# Patient Record
Sex: MALE | Race: BLACK OR AFRICAN AMERICAN | Employment: FULL TIME | URBAN - METROPOLITAN AREA
[De-identification: names, ages, dates, MRNs, and addresses within clinical notes are randomized per-mention and may not be internally consistent; named-entity substitution may affect disease eponyms.]

---

## 2017-01-25 DIAGNOSIS — I10 HTN (HYPERTENSION), BENIGN: ICD-10-CM

## 2017-01-25 DIAGNOSIS — E78.5 DYSLIPIDEMIA: ICD-10-CM

## 2017-01-26 RX ORDER — ATENOLOL 25 MG/1
TABLET ORAL
Qty: 45 TAB | Refills: 0 | Status: SHIPPED | OUTPATIENT
Start: 2017-01-26 | End: 2017-05-03 | Stop reason: SDUPTHER

## 2017-09-15 ENCOUNTER — OFFICE VISIT (OUTPATIENT)
Dept: CARDIOLOGY CLINIC | Age: 62
End: 2017-09-15

## 2017-09-15 VITALS
OXYGEN SATURATION: 98 % | DIASTOLIC BLOOD PRESSURE: 70 MMHG | HEART RATE: 51 BPM | RESPIRATION RATE: 16 BRPM | BODY MASS INDEX: 32.07 KG/M2 | WEIGHT: 224 LBS | SYSTOLIC BLOOD PRESSURE: 130 MMHG | HEIGHT: 70 IN

## 2017-09-15 DIAGNOSIS — I10 HTN (HYPERTENSION), BENIGN: ICD-10-CM

## 2017-09-15 DIAGNOSIS — I25.10 ATHEROSCLEROSIS OF NATIVE CORONARY ARTERY OF NATIVE HEART WITHOUT ANGINA PECTORIS: Primary | ICD-10-CM

## 2017-09-15 DIAGNOSIS — E78.5 DYSLIPIDEMIA: ICD-10-CM

## 2017-09-15 RX ORDER — CLOPIDOGREL BISULFATE 75 MG/1
75 TABLET ORAL DAILY
Qty: 30 TAB | Refills: 11
Start: 2017-09-15 | End: 2020-02-07 | Stop reason: SDUPTHER

## 2017-09-15 NOTE — PROGRESS NOTES
James Olson MD. [facility] [place]              Patient: Cheri Paz  : 1955      Today's Date: 9/15/2017          HISTORY OF PRESENT ILLNESS:     History of Present Illness:  Mr. Eliot Christine is here for follow-up. He is doing well. Still working. No cardiac complaints. No CP or SOB. No dizziness or lightheadedness. He exercises (walks) regularly. PAST MEDICAL HISTORY:     Past Medical History:   Diagnosis Date    Coronary atherosclerosis of native coronary artery     BESS to 90% prox LAD leison 1/3/12;   PCI in ;  MI  with PCI to LCX    Dyslipidemia     Gout     HTN (hypertension)          Past Surgical History:   Procedure Laterality Date    CARDIAC CATHETERIZATION  1/3/12    90% prox LAD stenosis; LVEF 55%; had Xience BESS placed to prox LAD    CARDIAC CATHETERIZATION      PCI to LCX    ECHO STRESS  11    he walked 6:31, stopping for chest pain. 2 mm inferolateral ST depressions . Stress echo images were grossly normal.     HX OTHER SURGICAL      Exercise cardiolite 13 - walked 9 min (10 METS), normal stress EKG and MPI, LVEF 52%     STRESS TEST CARDIAC  3/09    treadmill only - walked 9:05; blunted HR response due to beta-blocker; no angina and normal EKG    STRESS TEST CARDIAC  12    walked 9 min, (10.2 METS) non-diagnostic EKg chanes; normal stress test; few PVC's           MEDICATIONS:     Current Outpatient Prescriptions   Medication Sig Dispense Refill    atenolol (TENORMIN) 25 mg tablet TAKE ONE-HALF TABLET BY MOUTH ONCE DAILY 45 Tab 1    atorvastatin (LIPITOR) 40 mg tablet Take 1 Tab by mouth daily. 90 Tab 3    sildenafil citrate (VIAGRA) 50 mg tablet Take 1 Tab by mouth as needed. 5 Tab 12    ezetimibe (ZETIA) 10 mg tablet Take 1 Tab by mouth daily. 30 Tab 12    ranitidine hcl 150 mg capsule Take 150 mg by mouth two (2) times a day.  acetaminophen (TYLENOL) 325 mg tablet Take  by mouth every four (4) hours as needed for Pain.       ibuprofen (MOTRIN) 800 mg tablet Take  by mouth as needed.  hydrocortisone (CORTIZONE-10) 1 % topical cream Apply  to affected area as needed. use thin layer      Febuxostat (ULORIC) 80 mg Tab Take  by mouth.  nitroglycerin (NITROSTAT) 0.4 mg SL tablet 1 Tab by SubLINGual route every five (5) minutes as needed for Chest Pain. Call 911 if CP not better in 5 min. 25 Tab 3    clopidogrel (PLAVIX) 75 mg tablet Take 1 Tab by mouth daily. 30 Tab 11       Allergies   Allergen Reactions    Allopurinol Itching     swelling             SOCIAL HISTORY:     Social History   Substance Use Topics    Smoking status: Never Smoker    Smokeless tobacco: Never Used    Alcohol use No           FAMILY HISTORY:     Family History   Problem Relation Age of Onset    Stroke Sister              REVIEW OF SYSTEMS:         Review of Systems:    Constitutional: Negative for fever, chills    HEENT: Negative for vision changes.    Respiratory: Negative for cough    Cardiovascular: Negative for orthopnea, syncope, and PND.    Gastrointestinal: Negative for abdominal pain, diarrhea, or melena    Genitourinary: Negative for dysuria    Musculoskeletal: Negative for myalgias.    Skin: Negative for rash    Heme: No problems bleeding.    Neurological: Negative for speech change and focal weakness.                       PHYSICAL EXAM:        Physical Exam:  Visit Vitals    /70 (BP 1 Location: Left arm, BP Patient Position: Sitting)    Pulse (!) 51    Resp 16    Ht 5' 9.5\" (1.765 m)    Wt 224 lb (101.6 kg)    SpO2 98%    BMI 32.6 kg/m2       Patient appears generally well, mood and affect are appropriate and pleasant. HEENT: Hearing intact, non-icteric, normocephalic, atraumatic.    Neck Exam: Supple, No JVD or carotid bruits.    Lung Exam: Clear to auscultation, even breath sounds.    Cardiac Exam: Regular rate and rhythm with no murmur  Abdomen: Soft, non-tender, normal bowel sounds. No bruits or masses.   Extremities: Moves all ext well. No lower extremity edema. Vascular: 2+ dorsalis pedis pulses bilaterally. Psych: Appropriate affect  Neuro - Grossly intact                  LABS / OTHER STUDIES:         Labs 9/3/15 - Cr 1.1, K 3.6, Alk Phos 169, AST/ALT OK, chol 148, HDL 35, LDL 96, ,           CARDIAC DIAGNOSTICS:             EKG 2/11/13 - marked sinus bradycardia, HR 47, NSST changes    EKG 8/11/13 - sinus bradycardia, HR 54, ST-T changes in the inferolateral leads (ST-T changes are more pronounced c/w 2/11/13)    EKG 11/26/14 - sinus bradycardia, HR 42, inferolateral ST-T changes    EKG 3/16/16 - sinus linnea, HR 49, inferolateral ST-T changes   EKG 9/15/17 - sinus linnea, HR 51, NSST changes               ASSESSMENT AND PLAN:         Assessment and Plan:   1) CAD    - PCI in 2006; MI 11/08 with PCI to LCX; BESS to 90% prox LAD leison 1/3/12  - Exercise cardiolite 8/20/13 - walked 9 min (10 METS), normal stress EKG and MPI, LVEF 52%    - Mr. Brooklyn Campos denies any anginal complaints    - He is on plavix, statin, low dose BB --- (he takes a single antiplatelet)   - discussed importance of healthy diet and,exercise       2) HTN    - Due to concerns of gout, we cut back chlorthalidone in past and this was later stopped  - continue meds  - Asked him to follow BP at home  (goal < 135/85)       3) Dyslipidemia  - Goal LDL < 70 given that he has had coronary stenting 3 times  - He is on atorvastatin and zetia - lipids followed by PCP - will get labs to review       4) Colonoscopy in October  - he can hold plavix 5 days prior to procedure and resume afterwards. Take ASA 81 mg daily while off of plavix     5) He would like to follow-up in [place] since easier for him. Will arrange him to see Dr. Abby Polanco there. Patient expressed understanding of the plan - questions were answered.       He is retired from [employer].   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    Brigette Haji MD, [address] [facility] [address]  [address] [facility], [facility] [address]  [address], [address]  Pamela, [address] [place] [address]  Ph: [phone]   Ph [phone]

## 2017-09-22 ENCOUNTER — OP HISTORICAL/CONVERTED ENCOUNTER (OUTPATIENT)
Dept: OTHER | Age: 62
End: 2017-09-22

## 2018-02-07 ENCOUNTER — OP HISTORICAL/CONVERTED ENCOUNTER (OUTPATIENT)
Dept: OTHER | Age: 63
End: 2018-02-07

## 2018-04-13 ENCOUNTER — OP HISTORICAL/CONVERTED ENCOUNTER (OUTPATIENT)
Dept: OTHER | Age: 63
End: 2018-04-13

## 2018-04-24 LAB
CREATININE, EXTERNAL: 1.03
HBA1C MFR BLD HPLC: 6 %
LDL-C, EXTERNAL: 66

## 2018-06-22 ENCOUNTER — HOSPITAL ENCOUNTER (OUTPATIENT)
Dept: LAB | Age: 63
Discharge: HOME | End: 2018-06-22
Payer: MEDICARE

## 2018-06-22 ENCOUNTER — OFFICE VISIT (OUTPATIENT)
Dept: ENDOCRINOLOGY | Age: 63
End: 2018-06-22

## 2018-06-22 VITALS
TEMPERATURE: 97.5 F | RESPIRATION RATE: 18 BRPM | OXYGEN SATURATION: 95 % | WEIGHT: 227.2 LBS | DIASTOLIC BLOOD PRESSURE: 78 MMHG | HEIGHT: 69 IN | BODY MASS INDEX: 33.65 KG/M2 | SYSTOLIC BLOOD PRESSURE: 126 MMHG | HEART RATE: 60 BPM

## 2018-06-22 DIAGNOSIS — E78.5 DYSLIPIDEMIA: ICD-10-CM

## 2018-06-22 DIAGNOSIS — I25.10 ATHEROSCLEROSIS OF NATIVE CORONARY ARTERY OF NATIVE HEART WITHOUT ANGINA PECTORIS: ICD-10-CM

## 2018-06-22 DIAGNOSIS — R73.03 PREDIABETES: ICD-10-CM

## 2018-06-22 DIAGNOSIS — E74.39 GLUCOSE INTOLERANCE: ICD-10-CM

## 2018-06-22 DIAGNOSIS — E55.9 VITAMIN D DEFICIENCY: ICD-10-CM

## 2018-06-22 DIAGNOSIS — E07.9 THYROID DISEASE: ICD-10-CM

## 2018-06-22 DIAGNOSIS — R74.8 ELEVATED ALKALINE PHOSPHATASE LEVEL: Primary | ICD-10-CM

## 2018-06-22 PROCEDURE — 83036 HEMOGLOBIN GLYCOSYLATED A1C: CPT

## 2018-06-22 PROCEDURE — 80053 COMPREHEN METABOLIC PANEL: CPT

## 2018-06-22 PROCEDURE — 84080 ASSAY ALKALINE PHOSPHATASES: CPT

## 2018-06-22 PROCEDURE — 84443 ASSAY THYROID STIM HORMONE: CPT

## 2018-06-22 PROCEDURE — 84550 ASSAY OF BLOOD/URIC ACID: CPT

## 2018-06-22 PROCEDURE — 36415 COLL VENOUS BLD VENIPUNCTURE: CPT

## 2018-06-22 PROCEDURE — 82306 VITAMIN D 25 HYDROXY: CPT

## 2018-06-22 NOTE — PATIENT INSTRUCTIONS
--------------------------------------------------------------------------------------------    Refills    -    please call your pharmacy and have them send us a refill request    Results  -  allow up to a week for lab results to be processed and reviewed. Phone calls  -  Allow upto 24 hrs.  for non-urgent calls to be retained    Prior authorization - It may take up to 2 weeks to process, depending on your insurance    Forms  -  FMLA, DMV, patient assistance, etc. will take up to 2 weeks to process    Cancellations - please notify the office in advance if you cannot keep your appointment    Samples  - will only be dispensed at visits as supply is limited      If you are having a medical emergency call 911    --------------------------------------------------------------------------------------------

## 2018-06-22 NOTE — MR AVS SNAPSHOT
[address] [id number] 
[phone] Patient: Corie Petty MRN: [id number] JQS:[id number] Visit Information Date & Time Provider Department Dept. Phone Encounter #  
 6/22/2018  1:00 PM Chico Alonso MD [facility] Diabetes & Endocrinology [phone] [id number] Follow-up Instructions Return in about 6 months (around 12/22/2018). Your Appointments 10/5/2018 11:40 AM  
ESTABLISHED PATIENT with Tosha Schmidt MD  
[facility] ([address]) Appt Note: annual visit; 4/17/18 lm for pt of appt day and time change from 9/21/18 sll [address] [facility] [address] [address] [address] Upcoming Health Maintenance Date Due Hepatitis C Screening 1955 DTaP/Tdap/Td series (1 - Tdap) 4/24/1974 FOBT Q 1 YEAR AGE 50-75 4/24/2003 ZOSTER VACCINE AGE 60> 2/24/2013 GLAUCOMA SCREENING Q2Y 4/24/2018 Pneumococcal 65+ Low/Medium Risk (1 of 2 - PCV13) 4/24/2018 MEDICARE YEARLY EXAM 5/10/2018 Influenza Age 5 to Adult 8/1/2018 Allergies as of 6/22/2018  Review Complete On: 6/22/2018 By: Chico Alonso MD  
  
 Severity Noted Reaction Type Reactions Allopurinol  04/25/2012    Itching  
 swelling Current Immunizations  Never Reviewed No immunizations on file. Not reviewed this visit You Were Diagnosed With   
  
 Codes Comments Elevated alkaline phosphatase level    -  Primary ICD-10-CM: R74.8 ICD-9-CM: 790.5 Prediabetes     ICD-10-CM: R73.03 
ICD-9-CM: 790.29 Glucose intolerance     ICD-10-CM: E74.39 
ICD-9-CM: 271.3 Dyslipidemia     ICD-10-CM: E78.5 ICD-9-CM: 272.4 Atherosclerosis of native coronary artery of native heart without angina pectoris     ICD-10-CM: I25.10 ICD-9-CM: 414.01 Thyroid disease     ICD-10-CM: E07.9 ICD-9-CM: 246. 9 Vitamin D deficiency     ICD-10-CM: E55.9 ICD-9-CM: 268.9 Vitals BP Pulse Temp Resp Height(growth percentile) Weight(growth percentile) 126/78 (BP 1 Location: Left arm, BP Patient Position: Sitting) 60 97.5 °F (36.4 °C) (Oral) 18 5' 9\" (1.753 m) 227 lb 3.2 oz (103.1 kg) SpO2 BMI Smoking Status 95% 33.55 kg/m2 Never Smoker Vitals History BMI and BSA Data Body Mass Index Body Surface Area  
 33.55 kg/m 2 2.24 m 2 Preferred Pharmacy Pharmacy Name Phone [facility] [address] [place] [address] [phone] Your Updated Medication List  
  
   
This list is accurate as of 6/22/18  1:23 PM.  Always use your most recent med list.  
  
  
  
  
 atenolol 25 mg tablet Commonly known as:  TENORMIN  
TAKE ONE-HALF TABLET BY MOUTH ONCE DAILY  
  
 atorvastatin 40 mg tablet Commonly known as:  LIPITOR Take 1 Tab by mouth daily. clopidogrel 75 mg Tab Commonly known as:  PLAVIX Take 1 Tab by mouth daily. CORTIZONE-10 1 % topical cream  
Generic drug:  hydrocortisone Apply  to affected area as needed. use thin layer  
  
 ezetimibe 10 mg tablet Commonly known as:  Thomas Manges Take 1 Tab by mouth daily. ibuprofen 800 mg tablet Commonly known as:  MOTRIN Take  by mouth as needed. nitroglycerin 0.4 mg SL tablet Commonly known as:  NITROSTAT  
1 Tab by SubLINGual route every five (5) minutes as needed for Chest Pain. Call 911 if CP not better in 5 min. raNITIdine hcl 150 mg capsule Take 150 mg by mouth two (2) times a day. sildenafil citrate 50 mg tablet Commonly known as:  VIAGRA Take 1 Tab by mouth as needed. TYLENOL 325 mg tablet Generic drug:  acetaminophen Take  by mouth every four (4) hours as needed for Pain. ULORIC 80 mg Tab tablet Generic drug:  febuxostat Take  by mouth. We Performed the Following ALKALINE PHOSPHATASE, BONE [42707 CPT(R)] HEMOGLOBIN A1C WITH EAG [09500 CPT(R)] METABOLIC PANEL, COMPREHENSIVE [87271 CPT(R)] TSH 3RD GENERATION [93818 CPT(R)] URIC ACID S2081199 CPT(R)] VITAMIN D, 25 HYDROXY [id number] CPT(R)] Follow-up Instructions Return in about 6 months (around 12/22/2018). Patient Instructions   
-------------------------------------------------------------------------------------------- Refills    -    please call your pharmacy and have them send us a refill request 
 
Results  -  allow up to a week for lab results to be processed and reviewed. Phone calls  -  Allow upto 24 hrs. for non-urgent calls to be retained Prior authorization - It may take up to 2 weeks to process, depending on your insurance Forms  -  FMLA, DMV, patient assistance, etc. will take up to 2 weeks to process Cancellations - please notify the office in advance if you cannot keep your appointment Samples  - will only be dispensed at visits as supply is limited If you are having a medical emergency call 911 
 
-------------------------------------------------------------------------------------------- Introducing [facility]! [employer] introduces [employer] patient portal. Now you can access parts of your medical record, email your doctor's office, and request medication refills online. 1. In your internet browser, go to [email or web address] 2. Click on the First Time User? Click Here link in the Sign In box. You will see the New Member Sign Up page. 3. Enter your [employer] Access Code exactly as it appears below. You will not need to use this code after youve completed the sign-up process. If you do not sign up before the expiration date, you must request a new code. · [employer] Access Code: [email or web address] Expires: 9/20/2018  1:22 PM 
 
4.  Enter the last four digits of your Social Security Number (xxxx) and Date of Birth (mm/dd/yyyy) as indicated and click Submit. You will be taken to the next sign-up page. 5. Create a [employer] ID. This will be your [employer] login ID and cannot be changed, so think of one that is secure and easy to remember. 6. Create a [employer] password. You can change your password at any time. 7. Enter your Password Reset Question and Answer. This can be used at a later time if you forget your password. 8. Enter your e-mail address. You will receive e-mail notification when new information is available in [address] 9. Click Sign Up. You can now view and download portions of your medical record. 10. Click the Download Summary menu link to download a portable copy of your medical information. If you have questions, please visit the Frequently Asked Questions section of the [employer] website. Remember, [employer] is NOT to be used for urgent needs. For medical emergencies, dial 911. Now available from your iPhone and Android! Please provide this summary of care documentation to your next provider. Your primary care clinician is listed as Katherine River. If you have any questions after today's visit, please call [phone].

## 2018-06-22 NOTE — PROGRESS NOTES
Wt Readings from Last 3 Encounters:   06/22/18 227 lb 3.2 oz (103.1 kg)   09/15/17 224 lb (101.6 kg)   09/26/16 226 lb 6.4 oz (102.7 kg)     Temp Readings from Last 3 Encounters:   06/22/18 97.5 °F (36.4 °C) (Oral)   01/05/12 98.1 °F (36.7 °C)     BP Readings from Last 3 Encounters:   06/22/18 126/78   09/15/17 130/70   09/26/16 140/75     Pulse Readings from Last 3 Encounters:   06/22/18 60   09/15/17 (!) 51   09/26/16 (!) 56

## 2018-06-22 NOTE — LETTER
6/25/2018 3:31 PM 
 
Patient:  Matty Martinez YOB: 1955 Date of Visit: 6/22/2018 Dear Yosef Colon, [address] [place], [place] [address]Karen Cowan [address] [address] VIA Facsimile: [phone] 
 : Thank you for referring Mr. Dashawn Gallegos to me for evaluation/treatment. Below are the relevant portions of my assessment and plan of care. Wt Readings from Last 3 Encounters:  
06/22/18 227 lb 3.2 oz (103.1 kg) 09/15/17 224 lb (101.6 kg) 09/26/16 226 lb 6.4 oz (102.7 kg) Temp Readings from Last 3 Encounters:  
06/22/18 97.5 °F (36.4 °C) (Oral) 01/05/12 98.1 °F (36.7 °C) BP Readings from Last 3 Encounters:  
06/22/18 126/78  
09/15/17 130/70  
09/26/16 140/75 Pulse Readings from Last 3 Encounters:  
06/22/18 60  
09/15/17 (!) 51  
09/26/16 (!) 56 HISTORY OF PRESENT ILLNESS Matty Martinez is a 70 y.o. male. HPI Initial visit for eval and management of  Elevated alkaline phosphatase He denies any bone related problems thus far He denies any gall bladder issues as well He is asymptomatic He is a pt with of course, wrong dietary habits Past Medical History:  
Diagnosis Date  Coronary atherosclerosis of native coronary artery BESS to 90% prox LAD leison 1/3/12;   PCI in 2006;  MI 11/08 with PCI to LCX  Dyslipidemia  Gout   
 HTN (hypertension) Social History Social History  Marital status:  Spouse name: N/A  
 Number of children: N/A  
 Years of education: N/A Occupational History  Not on file. Social History Main Topics  Smoking status: Never Smoker  Smokeless tobacco: Never Used  Alcohol use No  
 Drug use: No  
 Sexual activity: Not on file Other Topics Concern  Not on file Social History Narrative Family History Problem Relation Age of Onset  Stroke Sister Review of Systems Constitutional: Negative. HENT: Negative. Eyes: Negative for pain and redness. Respiratory: Negative. Cardiovascular: Negative for chest pain, palpitations and leg swelling. Gastrointestinal: Negative. Negative for constipation. Genitourinary: Negative. Musculoskeletal: Negative for myalgias. Skin: Negative. Neurological: Negative. Endo/Heme/Allergies: Negative. Psychiatric/Behavioral: Negative for depression and memory loss. The patient does not have insomnia. Physical Exam  
Constitutional: He is oriented to person, place, and time. He appears well-developed and well-nourished. HENT:  
Head: Normocephalic. Eyes: Conjunctivae and EOM are normal. Pupils are equal, round, and reactive to light. Neck: Normal range of motion. Neck supple. No JVD present. No tracheal deviation present. No thyromegaly present. Cardiovascular: Normal rate, regular rhythm and normal heart sounds. Pulmonary/Chest: Effort normal and breath sounds normal.  
Abdominal: Soft. Bowel sounds are normal.  
Musculoskeletal: Normal range of motion. Lymphadenopathy:  
  He has no cervical adenopathy. Neurological: He is alert and oriented to person, place, and time. He has normal reflexes. Skin: Skin is warm. Psychiatric: He has a normal mood and affect. ASSESSMENT and PLAN 1. Elevated alkaline phosphatase : differential is bone versus liver Will do bone specific alk phos level Will do further eval based on that 2. Prediabetes : a1c is 6 % Educated on diet 3. Obesity : Body mass index is 33.55 kg/(m^2). He is advised to lose weight 4. CAD - angioplasty - f/u with Dr. Mega Madrid He appears stable  
 
 
> 50 % of time is spent on counseling Patient voiced understanding her plan of care If you have questions, please do not hesitate to call me. I look forward to following Mr. Tanja Miller along with you. Sincerely, Maile Varela MD

## 2018-06-22 NOTE — PROGRESS NOTES
HISTORY OF PRESENT ILLNESS  Alex Platt is a 70 y.o. male. HPI    Initial visit for eval and management of  Elevated alkaline phosphatase     He denies any bone related problems thus far   He denies any gall bladder issues as well     He is asymptomatic   He is a pt with of course, wrong dietary habits        Past Medical History:   Diagnosis Date    Coronary atherosclerosis of native coronary artery     BESS to 90% prox LAD leison 1/3/12;   PCI in 2006;  MI 11/08 with PCI to LCX    Dyslipidemia     Gout     HTN (hypertension)        Social History     Social History    Marital status:      Spouse name: N/A    Number of children: N/A    Years of education: N/A     Occupational History    Not on file. Social History Main Topics    Smoking status: Never Smoker    Smokeless tobacco: Never Used    Alcohol use No    Drug use: No    Sexual activity: Not on file     Other Topics Concern    Not on file     Social History Narrative       Family History   Problem Relation Age of Onset    Stroke Sister        Review of Systems   Constitutional: Negative. HENT: Negative. Eyes: Negative for pain and redness. Respiratory: Negative. Cardiovascular: Negative for chest pain, palpitations and leg swelling. Gastrointestinal: Negative. Negative for constipation. Genitourinary: Negative. Musculoskeletal: Negative for myalgias. Skin: Negative. Neurological: Negative. Endo/Heme/Allergies: Negative. Psychiatric/Behavioral: Negative for depression and memory loss. The patient does not have insomnia. Physical Exam   Constitutional: He is oriented to person, place, and time. He appears well-developed and well-nourished. HENT:   Head: Normocephalic. Eyes: Conjunctivae and EOM are normal. Pupils are equal, round, and reactive to light. Neck: Normal range of motion. Neck supple. No JVD present. No tracheal deviation present. No thyromegaly present.    Cardiovascular: Normal rate, regular rhythm and normal heart sounds. Pulmonary/Chest: Effort normal and breath sounds normal.   Abdominal: Soft. Bowel sounds are normal.   Musculoskeletal: Normal range of motion. Lymphadenopathy:     He has no cervical adenopathy. Neurological: He is alert and oriented to person, place, and time. He has normal reflexes. Skin: Skin is warm. Psychiatric: He has a normal mood and affect. ASSESSMENT and PLAN      1. Elevated alkaline phosphatase : differential is bone versus liver   Will do bone specific alk phos level   Will do further eval based on that       2. Prediabetes : a1c is 6 %   Educated on diet       3. Obesity : Body mass index is 33.55 kg/(m^2). He is advised to lose weight       4.  CAD - angioplasty - f/u with Dr. Jr Pruitt   He appears stable       > 50 % of time is spent on counseling   Patient voiced understanding her plan of care

## 2018-06-26 LAB
25(OH)D3+25(OH)D2 SERPL-MCNC: 20.3 NG/ML (ref 30–100)
ALBUMIN SERPL-MCNC: 4.5 G/DL (ref 3.6–4.8)
ALBUMIN/GLOB SERPL: 1.7 {RATIO} (ref 1.2–2.2)
ALP BONE SERPL-MCNC: 38.4 UG/L (ref 7.6–25.1)
ALP SERPL-CCNC: 209 IU/L (ref 39–117)
ALT SERPL-CCNC: 30 IU/L (ref 0–44)
AST SERPL-CCNC: 29 IU/L (ref 0–40)
BILIRUB SERPL-MCNC: 0.6 MG/DL (ref 0–1.2)
BUN SERPL-MCNC: 15 MG/DL (ref 8–27)
BUN/CREAT SERPL: 12 (ref 10–24)
CALCIUM SERPL-MCNC: 9.8 MG/DL (ref 8.6–10.2)
CHLORIDE SERPL-SCNC: 107 MMOL/L (ref 96–106)
CO2 SERPL-SCNC: 24 MMOL/L (ref 20–29)
CREAT SERPL-MCNC: 1.3 MG/DL (ref 0.76–1.27)
EST. AVERAGE GLUCOSE BLD GHB EST-MCNC: 120 MG/DL
GFR SERPLBLD CREATININE-BSD FMLA CKD-EPI: 57 ML/MIN/1.73
GFR SERPLBLD CREATININE-BSD FMLA CKD-EPI: 66 ML/MIN/1.73
GLOBULIN SER CALC-MCNC: 2.7 G/DL (ref 1.5–4.5)
GLUCOSE SERPL-MCNC: 91 MG/DL (ref 65–99)
HBA1C MFR BLD: 5.8 % (ref 4.8–5.6)
INTERPRETATION: NORMAL
POTASSIUM SERPL-SCNC: 4.4 MMOL/L (ref 3.5–5.2)
PROT SERPL-MCNC: 7.2 G/DL (ref 6–8.5)
SODIUM SERPL-SCNC: 144 MMOL/L (ref 134–144)
TSH SERPL DL<=0.005 MIU/L-ACNC: 0.8 UIU/ML (ref 0.45–4.5)
URATE SERPL-MCNC: 6 MG/DL (ref 3.7–8.6)

## 2018-07-02 ENCOUNTER — TELEPHONE (OUTPATIENT)
Dept: ENDOCRINOLOGY | Age: 63
End: 2018-07-02

## 2018-07-02 DIAGNOSIS — E55.9 VITAMIN D DEFICIENCY: Primary | ICD-10-CM

## 2018-07-02 NOTE — TELEPHONE ENCOUNTER
Verbal order per dr Adriana Reyes to place order (labs) for vitamin d  LM attempt to contact patient will try again

## 2018-07-09 NOTE — PROGRESS NOTES
Verified  and informed patient of dr Felix Lorenzo notes -and need to return by  to repeat labs for vitamin d

## 2018-07-17 DIAGNOSIS — E78.5 DYSLIPIDEMIA: ICD-10-CM

## 2018-07-17 DIAGNOSIS — I10 HTN (HYPERTENSION), BENIGN: ICD-10-CM

## 2018-07-17 RX ORDER — ATENOLOL 25 MG/1
12.5 TABLET ORAL DAILY
Qty: 45 TAB | Refills: 0 | Status: SHIPPED | OUTPATIENT
Start: 2018-07-17 | End: 2018-12-21 | Stop reason: ALTCHOICE

## 2018-07-17 NOTE — TELEPHONE ENCOUNTER
[facility] needs rx to state specific dose for atenolol.     atenolol (TENORMIN) 25 mg tablet 45 Tab 0 7/5/2018        Sig: TAKE ONE-HALF TABLET BY MOUTH ONCE DAILY      Cosign for Ordering: Accepted by Serena Galeano MD on 7/5/2018  4:33 PM      E-Prescribing Status: Receipt confirmed by pharmacy (7/5/2018 12:25 PM EDT)        lov 9/15/17    Nov 10/5/2018 11:40 AM

## 2018-07-27 ENCOUNTER — HOSPITAL ENCOUNTER (OUTPATIENT)
Dept: LAB | Age: 63
Discharge: HOME | End: 2018-07-27
Payer: MEDICARE

## 2018-07-27 PROCEDURE — 82306 VITAMIN D 25 HYDROXY: CPT

## 2018-07-27 PROCEDURE — 84075 ASSAY ALKALINE PHOSPHATASE: CPT

## 2018-07-27 PROCEDURE — 84080 ASSAY ALKALINE PHOSPHATASES: CPT

## 2018-07-27 PROCEDURE — 36415 COLL VENOUS BLD VENIPUNCTURE: CPT

## 2018-10-05 ENCOUNTER — OFFICE VISIT (OUTPATIENT)
Dept: CARDIOLOGY CLINIC | Age: 63
End: 2018-10-05

## 2018-10-05 VITALS
SYSTOLIC BLOOD PRESSURE: 128 MMHG | RESPIRATION RATE: 18 BRPM | WEIGHT: 228.8 LBS | HEART RATE: 60 BPM | BODY MASS INDEX: 33.89 KG/M2 | HEIGHT: 69 IN | DIASTOLIC BLOOD PRESSURE: 82 MMHG | OXYGEN SATURATION: 96 %

## 2018-10-05 DIAGNOSIS — I10 HTN (HYPERTENSION), BENIGN: ICD-10-CM

## 2018-10-05 DIAGNOSIS — I25.10 ATHEROSCLEROSIS OF NATIVE CORONARY ARTERY OF NATIVE HEART WITHOUT ANGINA PECTORIS: Primary | ICD-10-CM

## 2018-10-05 DIAGNOSIS — E78.5 DYSLIPIDEMIA: ICD-10-CM

## 2018-10-05 RX ORDER — DOXYCYCLINE 100 MG/1
100 TABLET ORAL 2 TIMES DAILY
COMMUNITY

## 2018-10-05 NOTE — PROGRESS NOTES
Janice Curran MD. [facility]              Patient: Claribel Holcomb  : 1955      Today's Date: 10/5/2018            HISTORY OF PRESENT ILLNESS:     History of Present Illness:  Here for follow-up. He feels wonderful, like he is 25 yo. No complaints. PAST MEDICAL HISTORY:     Past Medical History:   Diagnosis Date    Coronary atherosclerosis of native coronary artery     BESS to 90% prox LAD leison 1/3/12;   PCI in ;  [place]  with PCI to LCX    Dyslipidemia     Gout     HTN (hypertension)          Past Surgical History:   Procedure Laterality Date    CARDIAC CATHETERIZATION  1/3/12    90% prox LAD stenosis; LVEF 55%; had Xience BESS placed to prox LAD    CARDIAC CATHETERIZATION      PCI to LCX    ECHO STRESS  11    he walked 6:31, stopping for chest pain. 2 mm inferolateral ST depressions . Stress echo images were grossly normal.     HX OTHER SURGICAL      Exercise cardiolite 13 - walked 9 min (10 METS), normal stress EKG and MPI, LVEF 52%     STRESS TEST CARDIAC  3/09    treadmill only - walked 9:05; blunted HR response due to beta-blocker; no angina and normal EKG    STRESS TEST CARDIAC  12    walked 9 min, (10.2 METS) non-diagnostic EKg chanes; normal stress test; few PVC's           MEDICATIONS:     Current Outpatient Prescriptions   Medication Sig Dispense Refill    doxycycline (ADOXA) 100 mg tablet Take 100 mg by mouth two (2) times a day.  atenolol (TENORMIN) 25 mg tablet Take 0.5 Tabs by mouth daily. 45 Tab 0    clopidogrel (PLAVIX) 75 mg tab Take 1 Tab by mouth daily. 30 Tab 11    atorvastatin (LIPITOR) 40 mg tablet Take 1 Tab by mouth daily. 90 Tab 3    sildenafil citrate (VIAGRA) 50 mg tablet Take 1 Tab by mouth as needed. 5 Tab 12    ezetimibe (ZETIA) 10 mg tablet Take 1 Tab by mouth daily. 30 Tab 12    ranitidine hcl 150 mg capsule Take 150 mg by mouth two (2) times a day.       acetaminophen (TYLENOL) 325 mg tablet Take  by mouth every four (4) hours as needed for Pain.  ibuprofen (MOTRIN) 800 mg tablet Take  by mouth as needed.  Febuxostat (ULORIC) 80 mg Tab Take  by mouth daily.  nitroglycerin (NITROSTAT) 0.4 mg SL tablet 1 Tab by SubLINGual route every five (5) minutes as needed for Chest Pain. Call 911 if CP not better in 5 min. 25 Tab 3       Allergies   Allergen Reactions    Allopurinol Itching     swelling             SOCIAL HISTORY:     Social History   Substance Use Topics    Smoking status: Never Smoker    Smokeless tobacco: Never Used    Alcohol use No         FAMILY HISTORY:     Family History   Problem Relation Age of Onset    Stroke Sister              REVIEW OF SYSTEMS:         Review of Systems:    Constitutional: Negative for fever, chills    HEENT: Negative for vision changes.    Respiratory: Negative for cough    Cardiovascular: Negative for orthopnea, syncope, and PND.    Gastrointestinal: Negative for abdominal pain, diarrhea, or melena    Genitourinary: Negative for dysuria    Musculoskeletal: Negative for myalgias.    Skin: Negative for rash    Heme: No problems bleeding.    Neurological: Negative for speech change and focal weakness.                       PHYSICAL EXAM:        Physical Exam:  Visit Vitals    /82 (BP 1 Location: Left arm)    Pulse 60    Resp 18    Ht 5' 9\" (1.753 m)    Wt 228 lb 12.8 oz (103.8 kg)    SpO2 96%    BMI 33.79 kg/m2          Patient appears generally well, mood and affect are appropriate and pleasant. HEENT: Hearing intact, non-icteric, normocephalic, atraumatic.    Neck Exam: Supple, No JVD or carotid bruits.    Lung Exam: Clear to auscultation, even breath sounds.    Cardiac Exam: Regular rate and rhythm with no murmur  Abdomen: Soft, non-tender, normal bowel sounds. No bruits or masses. Extremities: Moves all ext well. No lower extremity edema. Vascular: 2+ dorsalis pedis pulses bilaterally.   Psych: Appropriate affect  Neuro - Grossly intact                  LABS / OTHER STUDIES:         Labs 9/3/15 - Cr 1.1, K 3.6, Alk Phos 169, AST/ALT OK, chol 148, HDL 35, LDL 96, ,           CARDIAC DIAGNOSTICS:             EKG 2/11/13 - marked sinus bradycardia, HR 47, NSST changes    EKG 8/11/13 - sinus bradycardia, HR 54, ST-T changes in the inferolateral leads (ST-T changes are more pronounced c/w 2/11/13)    EKG 11/26/14 - sinus bradycardia, HR 42, inferolateral ST-T changes    EKG 3/16/16 - sinus linnea, HR 49, inferolateral ST-T changes   EKG 9/15/17 - sinus linnea, HR 51, NSST changes   EKG 10/5/18 - sinus linnea, non-specific T wave inversion           ASSESSMENT AND PLAN:         Assessment and Plan:   1) CAD    - PCI in 2006; MI 11/08 with PCI to LCX; BESS to 90% prox LAD leison 1/3/12  - Exercise cardiolite 8/20/13 - walked 9 min (10 METS), normal stress EKG and MPI, LVEF 52%    - Mr. Denise Triplett denies any anginal complaints    - He is on plavix, statin, low dose BB --- (he takes a single antiplatelet)   - discussed importance of healthy diet and,exercise   - he walks a lot       2) HTN    - Due to concerns of gout, we cut back chlorthalidone in past and this was later stopped  - continue meds  - Asked him to follow BP at home  (goal < 135/85)       3) Dyslipidemia  - Goal LDL < 70 given that he has had coronary stenting 3 times  - He is on atorvastatin and zetia - lipids followed by PCP - will get labs to review        4)  Patient expressed understanding of the plan - questions were answered.       He is retired from [employer]. . 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 Has son (lives with son) and daughter.   Eduard Moore MD, [place] [address].  [address], [facility] [address]. Alexandria Santiago, [address] [place] [address]  Ph: [phone]   Ph [phone]

## 2018-10-05 NOTE — PROGRESS NOTES
Visit Vitals    /82 (BP 1 Location: Left arm)    Pulse 60    Resp 18    Ht 5' 9\" (1.753 m)    Wt 228 lb 12.8 oz (103.8 kg)    SpO2 96%    BMI 33.79 kg/m2

## 2018-12-14 ENCOUNTER — HOSPITAL ENCOUNTER (OUTPATIENT)
Dept: LAB | Age: 63
Discharge: HOME | End: 2018-12-14
Payer: MEDICARE

## 2018-12-14 DIAGNOSIS — R73.03 PREDIABETES: ICD-10-CM

## 2018-12-14 DIAGNOSIS — E07.9 THYROID DISEASE: ICD-10-CM

## 2018-12-14 DIAGNOSIS — E55.9 VITAMIN D DEFICIENCY: ICD-10-CM

## 2018-12-14 DIAGNOSIS — E74.39 GLUCOSE INTOLERANCE: ICD-10-CM

## 2018-12-14 DIAGNOSIS — R74.8 ELEVATED ALKALINE PHOSPHATASE LEVEL: ICD-10-CM

## 2018-12-14 DIAGNOSIS — I25.10 ATHEROSCLEROSIS OF NATIVE CORONARY ARTERY OF NATIVE HEART WITHOUT ANGINA PECTORIS: ICD-10-CM

## 2018-12-14 DIAGNOSIS — E78.5 DYSLIPIDEMIA: ICD-10-CM

## 2018-12-14 PROCEDURE — 83036 HEMOGLOBIN GLYCOSYLATED A1C: CPT

## 2018-12-14 PROCEDURE — 80053 COMPREHEN METABOLIC PANEL: CPT

## 2018-12-14 PROCEDURE — 84080 ASSAY ALKALINE PHOSPHATASES: CPT

## 2018-12-14 PROCEDURE — 36415 COLL VENOUS BLD VENIPUNCTURE: CPT

## 2018-12-18 LAB
ALBUMIN SERPL-MCNC: 4.1 G/DL (ref 3.6–4.8)
ALBUMIN/GLOB SERPL: 1.4 {RATIO} (ref 1.2–2.2)
ALP BONE SERPL-MCNC: 33.8 UG/L (ref 7.6–25.1)
ALP SERPL-CCNC: 220 IU/L (ref 39–117)
ALT SERPL-CCNC: 35 IU/L (ref 0–44)
AST SERPL-CCNC: 26 IU/L (ref 0–40)
BILIRUB SERPL-MCNC: 0.4 MG/DL (ref 0–1.2)
BUN SERPL-MCNC: 15 MG/DL (ref 8–27)
BUN/CREAT SERPL: 12 (ref 10–24)
CALCIUM SERPL-MCNC: 9.5 MG/DL (ref 8.6–10.2)
CHLORIDE SERPL-SCNC: 107 MMOL/L (ref 96–106)
CO2 SERPL-SCNC: 25 MMOL/L (ref 20–29)
CREAT SERPL-MCNC: 1.22 MG/DL (ref 0.76–1.27)
EST. AVERAGE GLUCOSE BLD GHB EST-MCNC: 128 MG/DL
GLOBULIN SER CALC-MCNC: 2.9 G/DL (ref 1.5–4.5)
GLUCOSE SERPL-MCNC: 86 MG/DL (ref 65–99)
HBA1C MFR BLD: 6.1 % (ref 4.8–5.6)
POTASSIUM SERPL-SCNC: 4.4 MMOL/L (ref 3.5–5.2)
PROT SERPL-MCNC: 7 G/DL (ref 6–8.5)
SODIUM SERPL-SCNC: 144 MMOL/L (ref 134–144)

## 2018-12-21 ENCOUNTER — HOSPITAL ENCOUNTER (OUTPATIENT)
Dept: LAB | Age: 63
Discharge: HOME | End: 2018-12-21
Payer: MEDICARE

## 2018-12-21 ENCOUNTER — OFFICE VISIT (OUTPATIENT)
Dept: ENDOCRINOLOGY | Age: 63
End: 2018-12-21

## 2018-12-21 VITALS
SYSTOLIC BLOOD PRESSURE: 136 MMHG | WEIGHT: 227 LBS | TEMPERATURE: 97.5 F | HEIGHT: 69 IN | DIASTOLIC BLOOD PRESSURE: 75 MMHG | BODY MASS INDEX: 33.62 KG/M2 | HEART RATE: 65 BPM | RESPIRATION RATE: 12 BRPM

## 2018-12-21 DIAGNOSIS — R74.8 ELEVATED ALKALINE PHOSPHATASE LEVEL: Primary | ICD-10-CM

## 2018-12-21 PROCEDURE — 83970 ASSAY OF PARATHORMONE: CPT

## 2018-12-21 PROCEDURE — 36415 COLL VENOUS BLD VENIPUNCTURE: CPT

## 2018-12-21 RX ORDER — LISINOPRIL 10 MG/1
TABLET ORAL DAILY
COMMUNITY
End: 2020-02-07 | Stop reason: SDUPTHER

## 2018-12-21 NOTE — PROGRESS NOTES
Wt Readings from Last 3 Encounters:   12/21/18 227 lb (103 kg)   10/05/18 228 lb 12.8 oz (103.8 kg)   06/22/18 227 lb 3.2 oz (103.1 kg)     Temp Readings from Last 3 Encounters:   12/21/18 97.5 °F (36.4 °C) (Oral)   06/22/18 97.5 °F (36.4 °C) (Oral)   01/05/12 98.1 °F (36.7 °C)     BP Readings from Last 3 Encounters:   12/21/18 136/75   10/05/18 128/82   06/22/18 126/78     Pulse Readings from Last 3 Encounters:   12/21/18 65   10/05/18 60   06/22/18 60     Lab Results   Component Value Date/Time    Hemoglobin A1c 6.1 (H) 12/14/2018 10:22 AM    Hemoglobin A1c, External 6.0 04/24/2018

## 2018-12-21 NOTE — PROGRESS NOTES
HISTORY OF PRESENT ILLNESS  Eduard Poe is a 70 y.o. male. First f/u after initial visit for eval and management of  Elevated alkaline phosphatase    From June 2018     Lost a lb   He had labs done       Old history :    He denies any bone related problems thus far   He denies any gall bladder issues as well     He is asymptomatic   He is a pt with of course, wrong dietary habits        Past Medical History:   Diagnosis Date    Coronary atherosclerosis of native coronary artery     BESS to 90% prox LAD leison 1/3/12;   PCI in 2006;  MI 11/08 with PCI to LCX    Dyslipidemia     Gout     HTN (hypertension)        Social History     Socioeconomic History    Marital status:      Spouse name: Not on file    Number of children: Not on file    Years of education: Not on file    Highest education level: Not on file   Social Needs    Financial resource strain: Not on file    Food insecurity - worry: Not on file    Food insecurity - inability: Not on file   OmniPV needs - medical: Not on file   Om[employer] needs - non-medical: Not on file   Occupational History    Not on file   Tobacco Use    Smoking status: Never Smoker    Smokeless tobacco: Never Used   Substance and Sexual Activity    Alcohol use: No     Alcohol/week: 0.0 oz    Drug use: No    Sexual activity: Not on file   Other Topics Concern    Not on file   Social History Narrative    Not on file       Family History   Problem Relation Age of Onset    Stroke Sister        Review of Systems   Constitutional: Negative. HENT: Negative. Eyes: Negative for pain and redness. Respiratory: Negative. Cardiovascular: Negative for chest pain, palpitations and leg swelling. Gastrointestinal: Negative. Negative for constipation. Genitourinary: Negative. Musculoskeletal: Negative for myalgias. Skin: Negative. Neurological: Negative. Endo/Heme/Allergies: Negative.     Psychiatric/Behavioral: Negative for depression and memory loss. The patient does not have insomnia. Physical Exam   Constitutional: He is oriented to person, place, and time. He appears well-developed and well-nourished. HENT:   Head: Normocephalic. Eyes: Conjunctivae and EOM are normal. Pupils are equal, round, and reactive to light. Neck: Normal range of motion. Neck supple. No JVD present. No tracheal deviation present. No thyromegaly present. Cardiovascular: Normal rate, regular rhythm and normal heart sounds. Pulmonary/Chest: Effort normal and breath sounds normal.   Abdominal: Soft. Bowel sounds are normal.   Musculoskeletal: Normal range of motion. Lymphadenopathy:     He has no cervical adenopathy. Neurological: He is alert and oriented to person, place, and time. He has normal reflexes. Skin: Skin is warm. Psychiatric: He has a normal mood and affect. ASSESSMENT and PLAN      1. Elevated alkaline phosphatase : still high   Bone related likely from higher Tandem ostase being higher. Unable to pinpoint any bone disorders - healthy middle aged man with no other s/s towards bone pathology   Obtaining an usg abdomen to rule out any gall bladder issues   Will do a  Bone scan as asymptomatic pagets disease is the only cause could be thought about       2. Prediabetes : a1c is 6 %   Educated on diet       3. Obesity : Body mass index is 33.52 kg/m². He is advised to lose weight       4.  CAD - angioplasty - f/u with Dr. Serrato Later   He appears stable       > 50 % of time is spent on counseling   Patient voiced understanding her plan of care

## 2018-12-22 LAB — PTH-INTACT SERPL-MCNC: 40 PG/ML (ref 15–65)

## 2019-01-25 ENCOUNTER — HOSPITAL ENCOUNTER (OUTPATIENT)
Dept: NUCLEAR MEDICINE | Age: 64
Discharge: HOME | End: 2019-01-25
Attending: INTERNAL MEDICINE
Payer: MEDICARE

## 2019-01-25 DIAGNOSIS — R74.8 ELEVATED ALKALINE PHOSPHATASE LEVEL: ICD-10-CM

## 2019-01-25 PROCEDURE — 78306 BONE IMAGING WHOLE BODY: CPT

## 2019-02-01 ENCOUNTER — HOSPITAL ENCOUNTER (OUTPATIENT)
Dept: ULTRASOUND IMAGING | Age: 64
Discharge: HOME | End: 2019-02-01
Attending: INTERNAL MEDICINE
Payer: MEDICARE

## 2019-02-01 DIAGNOSIS — R74.8 ELEVATED ALKALINE PHOSPHATASE LEVEL: ICD-10-CM

## 2019-02-01 PROCEDURE — 76700 US EXAM ABDOM COMPLETE: CPT

## 2019-02-01 NOTE — PROGRESS NOTES
Inform pt that bone scan is normal but for wear and tear in some joints- normal   No pagets disease     Ultrasound of abdomen was normal too

## 2019-06-14 DIAGNOSIS — R74.8 ELEVATED ALKALINE PHOSPHATASE LEVEL: ICD-10-CM

## 2019-06-15 LAB
ALBUMIN SERPL-MCNC: 4.3 G/DL (ref 3.6–4.8)
ALBUMIN/GLOB SERPL: 1.4 {RATIO} (ref 1.2–2.2)
ALP SERPL-CCNC: 180 IU/L (ref 39–117)
ALT SERPL-CCNC: 15 IU/L (ref 0–44)
AST SERPL-CCNC: 19 IU/L (ref 0–40)
BILIRUB SERPL-MCNC: 0.4 MG/DL (ref 0–1.2)
BUN SERPL-MCNC: 13 MG/DL (ref 8–27)
BUN/CREAT SERPL: 11 (ref 10–24)
CALCIUM SERPL-MCNC: 9.6 MG/DL (ref 8.6–10.2)
CHLORIDE SERPL-SCNC: 107 MMOL/L (ref 96–106)
CO2 SERPL-SCNC: 20 MMOL/L (ref 20–29)
CREAT SERPL-MCNC: 1.14 MG/DL (ref 0.76–1.27)
GLOBULIN SER CALC-MCNC: 3.1 G/DL (ref 1.5–4.5)
GLUCOSE SERPL-MCNC: 103 MG/DL (ref 65–99)
POTASSIUM SERPL-SCNC: 3.8 MMOL/L (ref 3.5–5.2)
PROT SERPL-MCNC: 7.4 G/DL (ref 6–8.5)
SODIUM SERPL-SCNC: 142 MMOL/L (ref 134–144)

## 2019-06-21 ENCOUNTER — OFFICE VISIT (OUTPATIENT)
Dept: ENDOCRINOLOGY | Age: 64
End: 2019-06-21

## 2019-06-21 VITALS
DIASTOLIC BLOOD PRESSURE: 77 MMHG | HEIGHT: 69 IN | RESPIRATION RATE: 18 BRPM | BODY MASS INDEX: 33.5 KG/M2 | TEMPERATURE: 96.5 F | OXYGEN SATURATION: 97 % | WEIGHT: 226.2 LBS | SYSTOLIC BLOOD PRESSURE: 133 MMHG | HEART RATE: 79 BPM

## 2019-06-21 DIAGNOSIS — R74.8 ELEVATED ALKALINE PHOSPHATASE LEVEL: Primary | ICD-10-CM

## 2019-06-21 DIAGNOSIS — R73.03 PREDIABETES: ICD-10-CM

## 2019-06-21 NOTE — PROGRESS NOTES
HISTORY OF PRESENT ILLNESS  Jordin Louis is a 75 y.o. male.     f/u after last visit for eval and management of  Elevated alkaline phosphatase    From December 2018     Had labs   No new symptoms         Old history :    Lost a lb   He had labs done       Old history :    He denies any bone related problems thus far   He denies any gall bladder issues as well     He is asymptomatic   He is a pt with of course, wrong dietary habits        Past Medical History:   Diagnosis Date    Coronary atherosclerosis of native coronary artery     BESS to 90% prox LAD leison 1/3/12;   PCI in 2006;  MI 11/08 with PCI to LCX    Dyslipidemia     Gout     HTN (hypertension)        Social History     Socioeconomic History    Marital status:      Spouse name: Not on file    Number of children: Not on file    Years of education: Not on file    Highest education level: Not on file   Occupational History    Not on file   Social Needs    Financial resource strain: Not on file    Food insecurity:     Worry: Not on file     Inability: Not on file    Transportation needs:     Medical: Not on file     Non-medical: Not on file   Tobacco Use    Smoking status: Never Smoker    Smokeless tobacco: Never Used   Substance and Sexual Activity    Alcohol use: No     Alcohol/week: 0.0 oz    Drug use: No    Sexual activity: Not on file   Lifestyle    Physical activity:     Days per week: Not on file     Minutes per session: Not on file    Stress: Not on file   Relationships    Social connections:     Talks on phone: Not on file     Gets together: Not on file     Attends Buddhist service: Not on file     Active member of club or organization: Not on file     Attends meetings of clubs or organizations: Not on file     Relationship status: Not on file    Intimate partner violence:     Fear of current or ex partner: Not on file     Emotionally abused: Not on file     Physically abused: Not on file     Forced sexual activity: Not on file   Other Topics Concern    Not on file   Social History Narrative    Not on file       Family History   Problem Relation Age of Onset    Stroke Sister        Review of Systems   Constitutional: Negative. HENT: Negative. Eyes: Negative for pain and redness. Respiratory: Negative. Cardiovascular: Negative for chest pain, palpitations and leg swelling. Gastrointestinal: Negative. Negative for constipation. Genitourinary: Negative. Musculoskeletal: Negative for myalgias. Skin: Negative. Neurological: Negative. Endo/Heme/Allergies: Negative. Psychiatric/Behavioral: Negative for depression and memory loss. The patient does not have insomnia. Physical Exam   Constitutional: He is oriented to person, place, and time. He appears well-developed and well-nourished. HENT:   Head: Normocephalic. Eyes: Pupils are equal, round, and reactive to light. Conjunctivae and EOM are normal.   Neck: Normal range of motion. Neck supple. No JVD present. No tracheal deviation present. No thyromegaly present. Cardiovascular: Normal rate, regular rhythm and normal heart sounds. Pulmonary/Chest: Effort normal and breath sounds normal.   Abdominal: Soft. Bowel sounds are normal.   Musculoskeletal: Normal range of motion. Lymphadenopathy:     He has no cervical adenopathy. Neurological: He is alert and oriented to person, place, and time. He has normal reflexes. Skin: Skin is warm. Psychiatric: He has a normal mood and affect. Lab Results   Component Value Date/Time    ALT (SGPT) 15 06/14/2019 10:11 AM    AST (SGOT) 19 06/14/2019 10:11 AM    Alk.  phosphatase 180 (H) 06/14/2019 10:11 AM    Bilirubin, total 0.4 06/14/2019 10:11 AM    Albumin 4.3 06/14/2019 10:11 AM    Protein, total 7.4 06/14/2019 10:11 AM    INR 1.2 (H) 01/03/2012 02:00 PM    Prothrombin time 11.4 (H) 01/03/2012 02:00 PM    PLATELET 741 82/17/7050 04:20 AM     Lab Results   Component Value Date/Time    GFR est non-AA 67 06/14/2019 10:11 AM    GFR est AA 77 06/14/2019 10:11 AM    Creatinine 1.14 06/14/2019 10:11 AM    BUN 13 06/14/2019 10:11 AM    Sodium 142 06/14/2019 10:11 AM    Potassium 3.8 06/14/2019 10:11 AM    Chloride 107 (H) 06/14/2019 10:11 AM    CO2 20 06/14/2019 10:11 AM    PTH, Intact 40 12/21/2018 02:08 PM         ASSESSMENT and PLAN      1. Elevated alkaline phosphatase : trending down this time - 180 this time compared to prior times, over 200   Bone related likely, as Tandem ostase being higher. Unable to pinpoint any bone disorders - healthy middle aged man with no other s/s towards bone pathology   Obtained  usg abdomen  In dec 2018   to rule out any gall bladder issues / biliary tract issues and  And it was normal   Obtained  Bone scan jan 2019  , as  asymptomatic pagets disease is the only cause could be thought about and it came out as normal as well     Weight  loss is important    Alcohol in moderation      2. Prediabetes : a1c is 6 %   Educated on diet       3. Obesity : Body mass index is 33.4 kg/m². He is advised to lose weight       4.  CAD - angioplasty - f/u with Dr. Topete Devoid   He appears stable   Counseled to be on statins       > 50 % of time is spent on counseling   Patient voiced understanding her plan of care

## 2019-06-21 NOTE — LETTER
6/22/19 Patient: Javon Linares YOB: 1955 Date of Visit: 6/21/2019 [facility], [address] [address] VIA Facsimile: [phone] Dear Neftali Credit, DO, Thank you for referring Mr. Finn Chakraborty to [address] [place] [address] for evaluation. My notes for this consultation are attached. If you have questions, please do not hesitate to call me. I look forward to following your patient along with you. Sincerely, Catalino Luna MD

## 2019-06-21 NOTE — PATIENT INSTRUCTIONS
-------------------------------------------------------------------------------------------- Refills    -    please call your pharmacy and have them send us a refill request 
 
Results  -  allow up to a week for lab results to be processed and reviewed. Phone calls  -  Allow upto 24 hrs. for non-urgent calls to be retained Prior authorization - It may take up to 4 weeks to process, depending on your insurance Forms  -  FMLA, DMV, patient assistance, etc. will take up to 2 weeks to process Cancellations - please notify the office in advance if you cannot keep your appointment Samples  - will only be dispensed at visits as supply is limited If you are having a medical emergency call 911 
 
--------------------------------------------------------------------------------------------

## 2019-06-21 NOTE — PROGRESS NOTES
1. Have you been to the ER, urgent care clinic since your last visit? No  Hospitalized since your last visit? No    2. Have you seen or consulted any other health care providers outside of the [address] [place] [address] since your last visit? Include any pap smears or colon screening.  No     Wt Readings from Last 3 Encounters:   06/21/19 226 lb 3.2 oz (102.6 kg)   12/21/18 227 lb (103 kg)   10/05/18 228 lb 12.8 oz (103.8 kg)     Temp Readings from Last 3 Encounters:   06/21/19 96.5 °F (35.8 °C) (Oral)   12/21/18 97.5 °F (36.4 °C) (Oral)   06/22/18 97.5 °F (36.4 °C) (Oral)     BP Readings from Last 3 Encounters:   06/21/19 133/77   12/21/18 136/75   10/05/18 128/82     Pulse Readings from Last 3 Encounters:   06/21/19 79   12/21/18 65   10/05/18 60

## 2019-10-18 ENCOUNTER — OFFICE VISIT (OUTPATIENT)
Dept: CARDIOLOGY CLINIC | Age: 64
End: 2019-10-18

## 2019-10-18 VITALS
HEART RATE: 67 BPM | WEIGHT: 227 LBS | RESPIRATION RATE: 16 BRPM | HEIGHT: 69 IN | BODY MASS INDEX: 33.62 KG/M2 | DIASTOLIC BLOOD PRESSURE: 86 MMHG | OXYGEN SATURATION: 96 % | SYSTOLIC BLOOD PRESSURE: 136 MMHG

## 2019-10-18 DIAGNOSIS — I10 HTN (HYPERTENSION), BENIGN: ICD-10-CM

## 2019-10-18 DIAGNOSIS — E78.5 DYSLIPIDEMIA: ICD-10-CM

## 2019-10-18 DIAGNOSIS — I25.10 ATHEROSCLEROSIS OF NATIVE CORONARY ARTERY OF NATIVE HEART WITHOUT ANGINA PECTORIS: Primary | ICD-10-CM

## 2019-10-18 RX ORDER — SILDENAFIL 50 MG/1
50 TABLET, FILM COATED ORAL AS NEEDED
COMMUNITY
End: 2020-02-07

## 2019-10-18 RX ORDER — ACETAMINOPHEN 325 MG/1
TABLET ORAL
COMMUNITY

## 2019-10-18 RX ORDER — IBUPROFEN 800 MG/1
TABLET ORAL
COMMUNITY
End: 2020-02-07

## 2019-10-18 RX ORDER — NITROGLYCERIN 0.4 MG/1
0.4 TABLET SUBLINGUAL
COMMUNITY
End: 2020-02-07 | Stop reason: SDUPTHER

## 2019-10-18 NOTE — PROGRESS NOTES
Messi Merino is a 75 y.o. male    Chief Complaint   Patient presents with    Annual Exam    Hypertension    Cholesterol Problem       Visit Vitals  /86 (BP 1 Location: Left arm, BP Patient Position: Sitting)   Pulse 67   Resp 16   Ht 5' 9\" (1.753 m)   Wt 227 lb (103 kg)   SpO2 96%   BMI 33.52 kg/m²       1. Have you been to the ER, urgent care clinic since your last visit? Hospitalized since your last visit? No    2. Have you seen or consulted any other health care providers outside of the [address] [place] [address] since your last visit? Include any pap smears or colon screening.  No

## 2019-10-18 NOTE — PROGRESS NOTES
Wanda Durbin MD. [facility]              Patient: Torie Whitt  : 1955      Today's Date: 10/18/2019            HISTORY OF PRESENT ILLNESS:     History of Present Illness:  Here for follow-up. Doing OK overall. No complaints. No CP or SOB. Still works. BP OK at home           PAST MEDICAL HISTORY:     Past Medical History:   Diagnosis Date    Coronary atherosclerosis of native coronary artery     BESS to 90% prox LAD leison 1/3/12;   PCI in ;  [place]  with PCI to LCX    Dyslipidemia     Gout     HTN (hypertension)        Past Surgical History:   Procedure Laterality Date    CARDIAC CATHETERIZATION  1/3/12    90% prox LAD stenosis; LVEF 55%; had Xience BESS placed to prox LAD    CARDIAC CATHETERIZATION      PCI to LCX    ECHO STRESS  11    he walked 6:31, stopping for chest pain. 2 mm inferolateral ST depressions . Stress echo images were grossly normal.     HX OTHER SURGICAL      Exercise cardiolite 13 - walked 9 min (10 METS), normal stress EKG and MPI, LVEF 52%     STRESS TEST CARDIAC  3/09    treadmill only - walked 9:05; blunted HR response due to beta-blocker; no angina and normal EKG    STRESS TEST CARDIAC  12    walked 9 min, (10.2 METS) non-diagnostic EKg chanes; normal stress test; few PVC's         MEDICATIONS:     Current Outpatient Medications   Medication Sig Dispense Refill    sildenafil citrate (VIAGRA) 50 mg tablet Take 50 mg by mouth as needed.  nitroglycerin (NITROSTAT) 0.4 mg SL tablet 0.4 mg by SubLINGual route every five (5) minutes as needed for Chest Pain. Up to 3 doses.  acetaminophen (TYLENOL) 325 mg tablet Take  by mouth every four (4) hours as needed for Pain.  ibuprofen (MOTRIN) 800 mg tablet Take  by mouth.  lisinopril (PRINIVIL, ZESTRIL) 10 mg tablet Take  by mouth daily.  doxycycline (ADOXA) 100 mg tablet Take 100 mg by mouth two (2) times a day.       clopidogrel (PLAVIX) 75 mg tab Take 1 Tab by mouth daily. 30 Tab 11    atorvastatin (LIPITOR) 40 mg tablet Take 1 Tab by mouth daily. 90 Tab 3    ezetimibe (ZETIA) 10 mg tablet Take 1 Tab by mouth daily. 30 Tab 12    ranitidine hcl 150 mg capsule Take 150 mg by mouth two (2) times a day.  Febuxostat (ULORIC) 80 mg Tab Take  by mouth daily. Allergies   Allergen Reactions    Allopurinol Itching     swelling             SOCIAL HISTORY:     Social History     Tobacco Use    Smoking status: Never Smoker    Smokeless tobacco: Never Used   Substance Use Topics    Alcohol use: No     Alcohol/week: 0.0 standard drinks    Drug use: No         FAMILY HISTORY:     Family History   Problem Relation Age of Onset    Stroke Sister              REVIEW OF SYSTEMS:         Review of Systems:    Constitutional: Negative for fever, chills    HEENT: Negative for vision changes.    Respiratory: Negative for cough    Cardiovascular: Negative for orthopnea, syncope, and PND.    Gastrointestinal: Negative for abdominal pain, diarrhea, or melena    Genitourinary: Negative for dysuria    Musculoskeletal: Negative for myalgias.    Skin: Negative for rash    Heme: No problems bleeding.    Neurological: Negative for speech change and focal weakness.                       PHYSICAL EXAM:        Physical Exam:  Visit Vitals  /86 (BP 1 Location: Left arm, BP Patient Position: Sitting)   Pulse 67   Resp 16   Ht 5' 9\" (1.753 m)   Wt 227 lb (103 kg)   SpO2 96%   BMI 33.52 kg/m²          Patient appears generally well, mood and affect are appropriate and pleasant. HEENT: Hearing intact, non-icteric, normocephalic, atraumatic.    Neck Exam: Supple, No JVD or carotid bruits.    Lung Exam: Clear to auscultation, even breath sounds.    Cardiac Exam: Regular rate and rhythm with no murmur  Abdomen: Soft, non-tender, normal bowel sounds. Extremities: Moves all ext well. No lower extremity edema.   Psych: Appropriate affect  Neuro - Grossly intact                  LABS / OTHER STUDIES:         Labs 9/3/15 - Cr 1.1, K 3.6, Alk Phos 169, AST/ALT OK, chol 148, HDL 35, LDL 96, ,       Will get labs from PCP     [facility]  CARDIAC DIAGNOSTICS:             EKG 2/11/13 - marked sinus bradycardia, HR 47, NSST changes    EKG 8/11/13 - sinus bradycardia, HR 54, ST-T changes in the inferolateral leads (ST-T changes are more pronounced c/w 2/11/13)    EKG 11/26/14 - sinus bradycardia, HR 42, inferolateral ST-T changes    EKG 3/16/16 - sinus linnea, HR 49, inferolateral ST-T changes   EKG 9/15/17 - sinus linnea, HR 51, NSST changes   EKG 10/5/18 - sinus linnea, non-specific T wave inversion   EKG 10/18/19 - sinus linnea, inferolateral TWI             ASSESSMENT AND PLAN:         Assessment and Plan:   1) CAD    - PCI in 2006; MI 11/08 with PCI to LCX; BESS to 90% prox LAD leison 1/3/12  - Exercise cardiolite 8/20/13 - walked 9 min (10 METS), normal stress EKG and MPI, LVEF 52%    - Mr. Giovany Ayala denies any anginal complaints    - He is on plavix, statin--- (he takes a single antiplatelet)   - No BB due to bradycardia   - discussed importance of healthy diet and,exercise   - he walks a lot       2) HTN    - Due to concerns of gout, stopped chlorthalidone in past   - continue meds  - BP looks OK   - Asked him to follow BP at home  (goal < 135/85)       3) Dyslipidemia  - Goal LDL < 70 given that he has had coronary stenting 3 times  - He is on atorvastatin and zetia - lipids followed by PCP - will get labs to review        4)  See me in one year. Patient expressed understanding of the plan - questions were answered.       He is retired from [employer].  . 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 [address] [facility] son (lives with son) and daughter.   Jordyn Jansen MD, [address]     [address]  [address] [place] [address]  Ph: [phone]                               Ph [phone]

## 2020-02-07 ENCOUNTER — OFFICE VISIT (OUTPATIENT)
Dept: ENDOCRINOLOGY | Age: 65
End: 2020-02-07

## 2020-02-07 ENCOUNTER — HOSPITAL ENCOUNTER (OUTPATIENT)
Dept: LAB | Age: 65
Discharge: HOME | End: 2020-02-07

## 2020-02-07 VITALS
HEART RATE: 80 BPM | OXYGEN SATURATION: 94 % | WEIGHT: 229.9 LBS | RESPIRATION RATE: 20 BRPM | HEIGHT: 69 IN | DIASTOLIC BLOOD PRESSURE: 67 MMHG | BODY MASS INDEX: 34.05 KG/M2 | TEMPERATURE: 96.4 F | SYSTOLIC BLOOD PRESSURE: 136 MMHG

## 2020-02-07 DIAGNOSIS — E78.5 DYSLIPIDEMIA: Primary | ICD-10-CM

## 2020-02-07 DIAGNOSIS — R73.03 PREDIABETES: ICD-10-CM

## 2020-02-07 DIAGNOSIS — R74.8 ELEVATED ALKALINE PHOSPHATASE LEVEL: ICD-10-CM

## 2020-02-07 DIAGNOSIS — I10 HTN (HYPERTENSION), BENIGN: ICD-10-CM

## 2020-02-07 DIAGNOSIS — E78.5 DYSLIPIDEMIA: ICD-10-CM

## 2020-02-07 DIAGNOSIS — I25.10 ATHEROSCLEROSIS OF NATIVE CORONARY ARTERY WITHOUT ANGINA PECTORIS: ICD-10-CM

## 2020-02-07 DIAGNOSIS — N52.2 DRUG-INDUCED ERECTILE DYSFUNCTION: ICD-10-CM

## 2020-02-07 RX ORDER — EZETIMIBE 10 MG/1
10 TABLET ORAL DAILY
Qty: 30 TAB | Refills: 12 | Status: SHIPPED | OUTPATIENT
Start: 2020-02-07 | End: 2021-10-29

## 2020-02-07 RX ORDER — ATORVASTATIN CALCIUM 40 MG/1
40 TABLET, FILM COATED ORAL
Qty: 90 TAB | Refills: 3 | Status: SHIPPED | OUTPATIENT
Start: 2020-02-07

## 2020-02-07 RX ORDER — CLOPIDOGREL BISULFATE 75 MG/1
75 TABLET ORAL DAILY
Qty: 30 TAB | Refills: 1 | Status: SHIPPED | OUTPATIENT
Start: 2020-02-07

## 2020-02-07 RX ORDER — LISINOPRIL 10 MG/1
10 TABLET ORAL DAILY
Qty: 30 TAB | Refills: 12 | Status: SHIPPED | OUTPATIENT
Start: 2020-02-07 | End: 2021-10-29

## 2020-02-07 RX ORDER — FAMOTIDINE 20 MG/1
20 TABLET, FILM COATED ORAL DAILY
Qty: 30 TAB | Refills: 1 | Status: SHIPPED | OUTPATIENT
Start: 2020-02-07

## 2020-02-07 RX ORDER — NITROGLYCERIN 0.4 MG/1
0.4 TABLET SUBLINGUAL
Qty: 1 TAB | Refills: 1 | Status: SHIPPED | OUTPATIENT
Start: 2020-02-07

## 2020-02-07 NOTE — PROGRESS NOTES
1. Have you been to the ER, urgent care clinic since your last visit? No  Hospitalized since your last visit? No    2. Have you seen or consulted any other health care providers outside of the [address] [place] [address] since your last visit? Include any pap smears or colon screening.  No    Wt Readings from Last 3 Encounters:   02/07/20 229 lb 14.4 oz (104.3 kg)   10/18/19 227 lb (103 kg)   06/21/19 226 lb 3.2 oz (102.6 kg)     Temp Readings from Last 3 Encounters:   02/07/20 96.4 °F (35.8 °C) (Oral)   06/21/19 96.5 °F (35.8 °C) (Oral)   12/21/18 97.5 °F (36.4 °C) (Oral)     BP Readings from Last 3 Encounters:   02/07/20 136/67   10/18/19 136/86   06/21/19 133/77     Pulse Readings from Last 3 Encounters:   02/07/20 80   10/18/19 67   06/21/19 79

## 2020-02-07 NOTE — PROGRESS NOTES
HISTORY OF PRESENT ILLNESS  David Smith is a 75 y.o. male.     f/u after last visit for eval and management of  Elevated alkaline phosphatase    From June 2019     Gained 2 lb  No new symptoms         Old history :    Lost a lb   He had labs done       Old history :    He denies any bone related problems thus far   He denies any gall bladder issues as well     He is asymptomatic   He is a pt with of course, wrong dietary habits        Past Medical History:   Diagnosis Date    Coronary atherosclerosis of native coronary artery     BESS to 90% prox LAD leison 1/3/12;   PCI in 2006;  MI 11/08 with PCI to LCX    Dyslipidemia     Gout     HTN (hypertension)        Social History     Socioeconomic History    Marital status:      Spouse name: Not on file    Number of children: Not on file    Years of education: Not on file    Highest education level: Not on file   Occupational History    Not on file   Social Needs    Financial resource strain: Not on file    Food insecurity:     Worry: Not on file     Inability: Not on file    Transportation needs:     Medical: Not on file     Non-medical: Not on file   Tobacco Use    Smoking status: Never Smoker    Smokeless tobacco: Never Used   Substance and Sexual Activity    Alcohol use: No     Alcohol/week: 0.0 standard drinks    Drug use: No    Sexual activity: Not on file   Lifestyle    Physical activity:     Days per week: Not on file     Minutes per session: Not on file    Stress: Not on file   Relationships    Social connections:     Talks on phone: Not on file     Gets together: Not on file     Attends Rastafari service: Not on file     Active member of club or organization: Not on file     Attends meetings of clubs or organizations: Not on file     Relationship status: Not on file    Intimate partner violence:     Fear of current or ex partner: Not on file     Emotionally abused: Not on file     Physically abused: Not on file     Forced sexual activity: Not on file   Other Topics Concern    Not on file   Social History Narrative    Not on file       Family History   Problem Relation Age of Onset    Stroke Sister        Review of Systems   Constitutional: Negative. HENT: Negative. Eyes: Negative for pain and redness. Respiratory: Negative. Cardiovascular: Negative for chest pain, palpitations and leg swelling. Gastrointestinal: Negative. Negative for constipation. Genitourinary: Negative. Musculoskeletal: Negative for myalgias. Skin: Negative. Neurological: Negative. Endo/Heme/Allergies: Negative. Psychiatric/Behavioral: Negative for depression and memory loss. The patient does not have insomnia. Physical Exam   Constitutional: He is oriented to person, place, and time. He appears well-developed and well-nourished. HENT:   Head: Normocephalic. Eyes: Pupils are equal, round, and reactive to light. Conjunctivae and EOM are normal.   Neck: Normal range of motion. Neck supple. No JVD present. No tracheal deviation present. No thyromegaly present. Cardiovascular: Normal rate, regular rhythm and normal heart sounds. Pulmonary/Chest: Effort normal and breath sounds normal.   Abdominal: Soft. Bowel sounds are normal.   Musculoskeletal: Normal range of motion. Lymphadenopathy:     He has no cervical adenopathy. Neurological: He is alert and oriented to person, place, and time. He has normal reflexes. Skin: Skin is warm. Psychiatric: He has a normal mood and affect. No labs  Done     ASSESSMENT and PLAN      1. Elevated alkaline phosphatase : trending down this time - 180 in June 2019    compared to prior times, over 200   Bone related likely, as Tandem ostase being higher.   Unable to pinpoint any bone disorders - healthy middle aged man with no other s/s towards bone pathology   Obtained  usg abdomen  In dec 2018   to rule out any gall bladder issues / biliary tract issues and  And it was normal   Obtained Bone scan jan 2019 :  as  asymptomatic pagets disease is the only cause could be thought about and it came out as normal as well     PATIENT APPEARED CONFUSED ABOUT THE CLINICAL REASON HE SEES ME FOR AND I TOTALLY UNDERSTAND HIS SITUATION AS this is not a disease or  Condition which is in public mind   Reassured him   Missed labs  Weight  loss is important  , emphasized   He quit ETOH gladly       2. Prediabetes : a1c is 6 %   Educated on diet       3. Obesity : Body mass index is 33.95 kg/m². He is advised to lose weight       4.  CAD - angioplasty - f/u with Dr. Esme Perry   He appears stable , to be on plavix   Counseled to be on statins       He could continue under care of PCP and see me in f/u once a year     > 50 % of time is spent on counseling   Patient voiced understanding her plan of care

## 2020-02-07 NOTE — PATIENT INSTRUCTIONS
-------------------------------------------------------------------------------------------- Refills    -    please call your pharmacy and have them send us a refill request 
 
Results  -  allow up to a week for lab results to be processed and reviewed. Phone calls  -  Allow upto 24 hrs. for non-urgent calls to be retained Prior authorization - It may take up to 4 weeks to process, depending on your insurance Forms  -  FMLA, DMV, patient assistance, etc. will take up to 2 weeks to process Cancellations - please notify the office in advance if you cannot keep your appointment Samples  - will only be dispensed at visits as supply is limited If you are having a medical emergency call 911 
 
-------------------------------------------------------------------------------------------- Stay on lipitor and zetia

## 2020-02-14 ENCOUNTER — APPOINTMENT (OUTPATIENT)
Dept: ENDOCRINOLOGY | Age: 65
End: 2020-02-14

## 2020-02-14 ENCOUNTER — HOSPITAL ENCOUNTER (OUTPATIENT)
Dept: LAB | Age: 65
Discharge: HOME | End: 2020-02-14

## 2020-02-14 DIAGNOSIS — R73.03 PREDIABETES: ICD-10-CM

## 2020-02-14 DIAGNOSIS — E78.5 DYSLIPIDEMIA: ICD-10-CM

## 2020-02-14 DIAGNOSIS — R74.8 ELEVATED ALKALINE PHOSPHATASE LEVEL: ICD-10-CM

## 2020-02-15 LAB
ALBUMIN SERPL-MCNC: 3.7 G/DL (ref 3.5–5)
ALBUMIN/GLOB SERPL: 1.1 {RATIO} (ref 1.1–2.2)
ALP SERPL-CCNC: 216 U/L (ref 45–117)
ALT SERPL-CCNC: 44 U/L (ref 12–78)
ANION GAP SERPL CALC-SCNC: 3 MMOL/L (ref 5–15)
AST SERPL-CCNC: 31 U/L (ref 15–37)
BILIRUB SERPL-MCNC: 0.6 MG/DL (ref 0.2–1)
BUN SERPL-MCNC: 17 MG/DL (ref 6–20)
BUN/CREAT SERPL: 14 (ref 12–20)
CALCIUM SERPL-MCNC: 9.1 MG/DL (ref 8.5–10.1)
CHLORIDE SERPL-SCNC: 112 MMOL/L (ref 97–108)
CHOLEST SERPL-MCNC: 142 MG/DL
CO2 SERPL-SCNC: 26 MMOL/L (ref 21–32)
CREAT SERPL-MCNC: 1.2 MG/DL (ref 0.7–1.3)
EST. AVERAGE GLUCOSE BLD GHB EST-MCNC: 128 MG/DL
GLOBULIN SER CALC-MCNC: 3.5 G/DL (ref 2–4)
GLUCOSE SERPL-MCNC: 95 MG/DL (ref 65–100)
HBA1C MFR BLD: 6.1 % (ref 4–5.6)
HDLC SERPL-MCNC: 38 MG/DL
HDLC SERPL: 3.7 {RATIO} (ref 0–5)
LDLC SERPL CALC-MCNC: 85.8 MG/DL (ref 0–100)
LIPID PROFILE,FLP: NORMAL
POTASSIUM SERPL-SCNC: 4.3 MMOL/L (ref 3.5–5.1)
PROT SERPL-MCNC: 7.2 G/DL (ref 6.4–8.2)
SODIUM SERPL-SCNC: 141 MMOL/L (ref 136–145)
TRIGL SERPL-MCNC: 91 MG/DL (ref ?–150)
URATE SERPL-MCNC: 5.6 MG/DL (ref 3.5–7.2)
VLDLC SERPL CALC-MCNC: 18.2 MG/DL

## 2020-10-23 ENCOUNTER — OFFICE VISIT (OUTPATIENT)
Dept: CARDIOLOGY CLINIC | Age: 65
End: 2020-10-23
Payer: MEDICARE

## 2020-10-23 VITALS
HEIGHT: 69 IN | BODY MASS INDEX: 33.47 KG/M2 | OXYGEN SATURATION: 98 % | SYSTOLIC BLOOD PRESSURE: 126 MMHG | DIASTOLIC BLOOD PRESSURE: 84 MMHG | WEIGHT: 226 LBS | HEART RATE: 60 BPM

## 2020-10-23 DIAGNOSIS — E78.5 DYSLIPIDEMIA: ICD-10-CM

## 2020-10-23 DIAGNOSIS — I10 HTN (HYPERTENSION), BENIGN: ICD-10-CM

## 2020-10-23 DIAGNOSIS — I25.10 ATHEROSCLEROSIS OF NATIVE CORONARY ARTERY OF NATIVE HEART WITHOUT ANGINA PECTORIS: Primary | ICD-10-CM

## 2020-10-23 PROCEDURE — G8754 DIAS BP LESS 90: HCPCS | Performed by: SPECIALIST

## 2020-10-23 PROCEDURE — 1101F PT FALLS ASSESS-DOCD LE1/YR: CPT | Performed by: SPECIALIST

## 2020-10-23 PROCEDURE — 93000 ELECTROCARDIOGRAM COMPLETE: CPT | Performed by: SPECIALIST

## 2020-10-23 PROCEDURE — 99214 OFFICE O/P EST MOD 30 MIN: CPT | Performed by: SPECIALIST

## 2020-10-23 PROCEDURE — G8752 SYS BP LESS 140: HCPCS | Performed by: SPECIALIST

## 2020-10-23 PROCEDURE — G8427 DOCREV CUR MEDS BY ELIG CLIN: HCPCS | Performed by: SPECIALIST

## 2020-10-23 PROCEDURE — G8417 CALC BMI ABV UP PARAM F/U: HCPCS | Performed by: SPECIALIST

## 2020-10-23 PROCEDURE — G8536 NO DOC ELDER MAL SCRN: HCPCS | Performed by: SPECIALIST

## 2020-10-23 PROCEDURE — G8432 DEP SCR NOT DOC, RNG: HCPCS | Performed by: SPECIALIST

## 2020-10-23 PROCEDURE — 3017F COLORECTAL CA SCREEN DOC REV: CPT | Performed by: SPECIALIST

## 2020-10-23 RX ORDER — SILDENAFIL 50 MG/1
50 TABLET, FILM COATED ORAL AS NEEDED
COMMUNITY

## 2020-10-23 NOTE — PROGRESS NOTES
Nabila Cortez MD. [facility]              Patient: Emmanuel Worley  : 1955      Today's Date: 10/23/2020                    Nabila Cortez MD. [facility]              Patient: Emmanuel oWrley  : 1955      Today's Date: 10/23/2020            HISTORY OF PRESENT ILLNESS:     History of Present Illness:  He has been doing well. No cardiac complaints. No CP or SOB. No dizziness. PAST MEDICAL HISTORY:     Past Medical History:   Diagnosis Date    Coronary atherosclerosis of native coronary artery     BESS to 90% prox LAD leison 1/3/12;   PCI in ;  MI  with PCI to LCX    Dyslipidemia     Gout     HTN (hypertension)        Past Surgical History:   Procedure Laterality Date    CARDIAC CATHETERIZATION  1/3/12    90% prox LAD stenosis; LVEF 55%; had Xience BESS placed to prox LAD    CARDIAC CATHETERIZATION      PCI to LCX    ECHO STRESS  11    he walked 6:31, stopping for chest pain. 2 mm inferolateral ST depressions . Stress echo images were grossly normal.     HX OTHER SURGICAL      Exercise cardiolite 13 - walked 9 min (10 METS), normal stress EKG and MPI, LVEF 52%     STRESS TEST CARDIAC  3/09    treadmill only - walked 9:05; blunted HR response due to beta-blocker; no angina and normal EKG    STRESS TEST CARDIAC  12    walked 9 min, (10.2 METS) non-diagnostic EKg chanes; normal stress test; few PVC's         MEDICATIONS:     Current Outpatient Medications   Medication Sig Dispense Refill    sildenafil citrate (Viagra) 50 mg tablet Take 50 mg by mouth as needed for Erectile Dysfunction.  nitroglycerin (NITROSTAT) 0.4 mg SL tablet 1 Tab by SubLINGual route every five (5) minutes as needed for Chest Pain. Up to 3 doses. 1 Tab 1    clopidogreL (PLAVIX) 75 mg tab Take 1 Tab by mouth daily. 30 Tab 1    famotidine (PEPCID) 20 mg tablet Take 1 Tab by mouth daily.  Stop ranitidine 30 Tab 1    lisinopril (PRINIVIL, ZESTRIL) 10 mg tablet Take 1 Tab by mouth daily. 30 Tab 12    ezetimibe (ZETIA) 10 mg tablet Take 1 Tab by mouth daily. 30 Tab 12    atorvastatin (LIPITOR) 40 mg tablet Take 1 Tab by mouth nightly. 90 Tab 3    acetaminophen (TYLENOL) 325 mg tablet Take  by mouth every four (4) hours as needed for Pain.  doxycycline (ADOXA) 100 mg tablet Take 100 mg by mouth two (2) times a day.  Febuxostat (ULORIC) 80 mg Tab Take  by mouth daily. Allergies   Allergen Reactions    Allopurinol Itching     swelling           SOCIAL HISTORY:     Social History     Tobacco Use    Smoking status: Never Smoker    Smokeless tobacco: Never Used   Substance Use Topics    Alcohol use: No     Alcohol/week: 0.0 standard drinks    Drug use: No         FAMILY HISTORY:     Family History   Problem Relation Age of Onset    Stroke Sister              REVIEW OF SYSTEMS:         Review of Systems:    Constitutional: Negative for fever, chills    HEENT: Negative for vision changes.    Respiratory: Negative for cough    Cardiovascular: Negative for orthopnea, syncope, and PND.    Gastrointestinal: Negative for abdominal pain, diarrhea, or melena    Genitourinary: Negative for dysuria    Musculoskeletal: Negative for myalgias.    Skin: Negative for rash    Heme: No problems bleeding.    Neurological: Negative for speech change and focal weakness.                   PHYSICAL EXAM:     Physical Exam:  Visit Vitals  /84 (BP 1 Location: Left arm, BP Patient Position: Sitting)   Pulse 60   Ht 5' 9\" (1.753 m)   Wt 226 lb (102.5 kg)   SpO2 98%   BMI 33.37 kg/m²     Patient appears generally well, mood and affect are appropriate and pleasant. HEENT:  Hearing intact, non-icteric, normocephalic, atraumatic. Neck Exam: Supple, No JVD or carotid bruits. Lung Exam: Clear to auscultation, even breath sounds. Cardiac Exam: Regular rate and rhythm with no murmur or rub  Abdomen: Soft, non-tender, normal bowel sounds. No bruits or masses. Extremities: Moves all ext well.  No lower extremity edema. MSKTL: Overall good ROM ext  Skin: No significant rashes  Vascular: 2+ dorsalis pedis pulses bilaterally. Psych: Appropriate affect  Neuro - Grossly intact                    LABS / OTHER STUDIES:         Lab Results   Component Value Date/Time    Sodium 141 02/14/2020 09:05 AM    Potassium 4.3 02/14/2020 09:05 AM    Chloride 112 (H) 02/14/2020 09:05 AM    CO2 26 02/14/2020 09:05 AM    Anion gap 3 (L) 02/14/2020 09:05 AM    Glucose 95 02/14/2020 09:05 AM    BUN 17 02/14/2020 09:05 AM    Creatinine 1.20 02/14/2020 09:05 AM    BUN/Creatinine ratio 14 02/14/2020 09:05 AM    GFR est AA >60 02/14/2020 09:05 AM    GFR est non-AA >60 02/14/2020 09:05 AM    Calcium 9.1 02/14/2020 09:05 AM    Bilirubin, total 0.6 02/14/2020 09:05 AM    Alk.  phosphatase 216 (H) 02/14/2020 09:05 AM    Protein, total 7.2 02/14/2020 09:05 AM    Albumin 3.7 02/14/2020 09:05 AM    Globulin 3.5 02/14/2020 09:05 AM    A-G Ratio 1.1 02/14/2020 09:05 AM    ALT (SGPT) 44 02/14/2020 09:05 AM    AST (SGOT) 31 02/14/2020 09:05 AM     Lab Results   Component Value Date/Time    WBC 5.0 01/05/2012 04:20 AM    HGB 15.8 01/05/2012 04:20 AM    HCT 46.6 01/05/2012 04:20 AM    PLATELET 314 61/89/2097 04:20 AM    MCV 84.4 01/05/2012 04:20 AM     Lab Results   Component Value Date/Time    Cholesterol, total 142 02/14/2020 09:05 AM    HDL Cholesterol 38 02/14/2020 09:05 AM    LDL, calculated 85.8 02/14/2020 09:05 AM    VLDL, calculated 18.2 02/14/2020 09:05 AM    Triglyceride 91 02/14/2020 09:05 AM    CHOL/HDL Ratio 3.7 02/14/2020 09:05 AM        Lab Results   Component Value Date/Time    Hemoglobin A1c 6.1 (H) 02/14/2020 09:05 AM    Hemoglobin A1c, External 6.0 04/24/2018           CARDIAC DIAGNOSTICS:             EKG 2/11/13 - marked sinus bradycardia, HR 47, NSST changes    EKG 8/11/13 - sinus bradycardia, HR 54, ST-T changes in the inferolateral leads (ST-T changes are more pronounced c/w 2/11/13)    EKG 11/26/14 - sinus bradycardia, HR 42, inferolateral ST-T changes    EKG 3/16/16 - sinus linnea, HR 49, inferolateral ST-T changes   EKG 9/15/17 - sinus linnea, HR 51, NSST changes   EKG 10/5/18 - sinus linnea, non-specific T wave inversion   EKG 10/18/19 - sinus linnea, inferolateral TWI   EKG 10/23/20 - sinus linnea, non-specific T wave abn              ASSESSMENT AND PLAN:         Assessment and Plan:   1) CAD    - PCI in 2006; MI 11/08 with PCI to LCX; BESS to 90% prox LAD leison 1/3/12  - Exercise cardiolite 8/20/13 - walked 9 min (10 METS), normal stress EKG and MPI, LVEF 52%    - Mr. Aida Archibald denies any anginal complaints    - He is on plavix, statin--- (he takes a single antiplatelet)   - No BB due to bradycardia   - discussed importance of healthy diet and,exercise   - he walks a lot       2) HTN    - Due to concerns of gout, stopped chlorthalidone in past   - continue meds  - BP looks OK   - Asked him to follow BP at home  (goal < 135/85)       3) Dyslipidemia  - Goal LDL < 70 given that he has had coronary stenting 3 times  - cont statin and zetia - prior lipids OK         4)  See me in one year. Patient expressed understanding of the plan - questions were answered.       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  Has son (lives with son) and daughter.   Lalo Morgan MD, [address] [place], [address]      [id number], [address] [place] [address], [address] [place] [address]  Ph: [phone]                                [phone]        ADDENDUM   2/1/2021  Nurse notes \"Oropharyngeal cancer having biopsy 2/5 with anesthesia. Not planning to stop plavix for biopsy. Just asking for cardiac clearance\". Mr. Aida Archibald can proceed with the biopsy and should have low cardiac risk. Will defer plavix use [id number] to the surgeon.   If necessary, he can hold plavix 5 days prior to the procedure and resume when safe. ADDENDUM   2/17/2021  Fax received from [facility] for clearance. Procedure: extraction of 5 teeth in left max & casey arches. Outpatient with local and socket hemostatic products and sutures. Medication requested to hold: Plavix    Mr. Kaykay Curran can proceed with his dental procedure and should have low cardiac risk. He can hold his plavix 5 days prior to surgery and resume when safe.

## 2020-10-23 NOTE — PROGRESS NOTES
Bessie Beasley is a 77 y.o. male    Visit Vitals  /84 (BP 1 Location: Left arm, BP Patient Position: Sitting)   Pulse 60   Ht 5' 9\" (1.753 m)   Wt 226 lb (102.5 kg)   SpO2 98%   BMI 33.37 kg/m²       Chief Complaint   Patient presents with    Hypertension    Other     DLD    Other     ATHERO       Chest pain NO  SOB NO  Dizziness NO  Swelling NO  Recent hospital visit NO  Refills NO

## 2020-12-23 ENCOUNTER — TRANSCRIBE ORDER (OUTPATIENT)
Dept: SCHEDULING | Age: 65
End: 2020-12-23

## 2020-12-23 DIAGNOSIS — D49.0 OROPHARYNGEAL NEOPLASM: Primary | ICD-10-CM

## 2021-01-08 ENCOUNTER — HOSPITAL ENCOUNTER (OUTPATIENT)
Dept: CT IMAGING | Age: 66
Discharge: HOME | End: 2021-01-08
Attending: SPECIALIST
Payer: MEDICARE

## 2021-01-08 DIAGNOSIS — D49.0 OROPHARYNGEAL NEOPLASM: ICD-10-CM

## 2021-01-08 LAB
BUN SERPL-MCNC: 14 MG/DL (ref 6–20)
CREAT SERPL-MCNC: 1.18 MG/DL (ref 0.7–1.3)

## 2021-01-08 PROCEDURE — 70492 CT SFT TSUE NCK W/O & W/DYE: CPT

## 2021-01-08 PROCEDURE — 84520 ASSAY OF UREA NITROGEN: CPT

## 2021-01-08 PROCEDURE — 74011000636 HC RX REV CODE- 636: Performed by: SPECIALIST

## 2021-01-08 PROCEDURE — 82565 ASSAY OF CREATININE: CPT

## 2021-01-08 PROCEDURE — 36415 COLL VENOUS BLD VENIPUNCTURE: CPT

## 2021-01-08 RX ADMIN — IOPAMIDOL 100 ML: 755 INJECTION, SOLUTION INTRAVENOUS at 16:21

## 2021-02-01 ENCOUNTER — TELEPHONE (OUTPATIENT)
Dept: CARDIOLOGY CLINIC | Age: 66
End: 2021-02-01

## 2021-02-01 NOTE — TELEPHONE ENCOUNTER
Patient states he is having surgery at [facility] on 2/5/21 and needs clearance from Dr. Vasquez Barrett to proceed with a removal of tumor his in throat. He states Dr. Luz Delgadillo RN, [facility], is requesting a call from you to give you more information about his procedure: [phone] Please advise.    Patient can be reached [phone]

## 2021-02-01 NOTE — TELEPHONE ENCOUNTER
[facility] CARDIOLOGY OFFICE FOLLOW UP NOTE       Date of Encounter: 12/15/2017   YOB: 1952   MRN: [id number]   Primary Care Provider: Pawel Palma MD     Reason for visit:  Chief Complaint   Patient presents with   • Cardiac Valve Problem     aortic valve stenosis, aortic valve regurgitation, six month follow up         ASSESSMENT AND PLAN       Assessment:   1. Aortic valve stenosis, etiology of cardiac valve disease unspecified    2. Thoracic aortic ectasia (CMS/HCC)    3. Hyperlipemia, mixed    4. Aortic valve insufficiency, etiology of cardiac valve disease unspecified    5. Coronary artery disease involving native coronary artery of native heart without angina pectoris    6. First degree AV block      Orders Placed This Encounter   • Electrocardiogram 12-Lead   • Echo M-Mode/2D/Doppler (Routine)     Return in about 6 months (around 6/15/2018) for Aortic valve stenosis.    RECOMMENDATIONS:  1. Continue with current medical therapy.  2. Repeat echocardiogram in 6 months to evaluate the ascending aorta and aortic valve.  3. He will call as necessary or if he develops new or changing symptoms. Discussed possible symptoms in regards to his valve.  4. Continue regular follow-up through primary care.  5. Discussed monitoring his lipid pressure more closely at home. Ideally systolic blood pressure should be around 130.  6. Tentatively plan on follow-up in 6 months.      Reviewed recent imaging with Dr. Bragg. Plan will be to have Dr. Hernandez take a look at the images as well. Suspect that his dilated thoracic aorta may may be closer to a normal size for him given that he is 6 foot 5 inches and 320 pounds. We will plan on repeating an echocardiogram to evaluate the ascending aorta and aortic valve in 6 months. He appears to be on an appropriate dose of atorvastatin and takes a daily aspirin in regards to his coronary disease that was seen on CT.     HISTORY OF PRESENT ILLNESS       Terrell Ponce was  Oropharyngeal cancer having biopsy 2/5 with anesthesia. Not planning to stop plavix for biopsy. Just asking for cardiac clearance.     Please fax to [facility] at [phone] seen in follow up at [facility] on 12/15/2017.      He is a very pleasant 65 year old male who presents in follow-up for moderate aortic valve stenosis and insufficiency. Echocardiogram completed 12/8/17 also indicated that his entire thoracic aorta was dilated about 4 cm and therefore had a CTA completed 12/14/17 showing dilation of the ascending aorta and aortic root measuring 5 cm and 4.5 cm respectively. Also had severe coronary calcification . In regards to his aortic valve, continues with moderate stenosis with mean gradient of 35.3 mmHg and a valve area of 1.2 cm². This is a slight progression from one year ago. Continues with moderate insufficiency. Overall systolic function is normal with an EF of 75%, no wall motion abnormalities and grade 1 diastolic dysfunction. The atria are both moderately enlarged.    Today's discussion: Today the patient reports doing well from a cardiac perspective. Denies symptoms of chest pain, shortness of breath, dizziness, syncope, PND, orthopnea, peripheral edema, or palpitations.    He exercises regularly utilizing a treadmill, stationary bike, and weights, 60 minutes daily. He has no exertional symptoms and continues to work part-time for [employer].    Recent Labs  Lab 11/10/17  0734   CHOLESTEROL 143   HDL 40   CALCLDL 69   TRIGLYCERIDE 171*   Cholesterol managed with: Atorvastatin 40 mg daily.        I have reviewed the patient's past medical history, surgical history, family history, social history, allergies, and current medications in the electronic medical record. I verify that they are complete and accurate.       SOCIAL HISTORY     History   Smoking Status   • Former Smoker   Smokeless Tobacco   • Never Used          ALLERGIES     ALLERGIES:  No Known Allergies     CURRENT MEDICATIONS       Current Outpatient Prescriptions   Medication Sig Dispense Refill   • amLODIPine (NORVASC) 5 MG tablet Take 1 tablet by mouth daily. 90 tablet 3   •  atorvastatin (LIPITOR) 40 MG tablet Take 1 tablet by mouth daily. 90 tablet 3   • irbesartan-hydrochlorothiazide (AVALIDE) 300-12.5 MG per tablet Take 1 tablet by mouth daily. 90 tablet 3   • metFORMIN (GLUCOPHAGE) 500 MG tablet Take 1 tablet by mouth before breakfast. 90 tablet 3   • ONE TOUCH ULTRA TEST strip Use to test blood sugars once daily Dx E11.9 50 strip 11   • Lancets (ONETOUCH ULTRASOFT) Misc Use to test blood sugars one time daily Dx code E11.9 100 each 11     No current facility-administered medications for this visit.      Facility-Administered Medications Ordered in Other Visits   Medication Dose Route Frequency Provider Last Rate Last Dose   • iopamidol (ISOVUE-370) 76 % injection 100 mL  100 mL Intravenous Once Dominic Turpin NP              REVIEW OF SYSTEMS       REVIEW OF SYSTEMS:  Constitutional: negative  Eye Problem(s):negative  ENT Problem(s):negative  Cardiovascular problem(s):negative  Respiratory problem(s):negative  Gastro-intestinal problem(s):negative GI  Genito-urinary problem(s):negative  Musculoskeletal problem(s):negative  Integumentary problem(s):negative  Neurological problem(s):negative  Psychiatric problem(s):negative  Endocrine problem(s):negative  Hematologic and/or Lymphatic problem(s):negative     Patient does take aspirin.     Physical Exam     Visit Vitals  /68   Ht 6' 6\" (1.981 m)   Wt (!) 145.2 kg   SpO2 97%   BMI 36.98 kg/m²       Wt Readings from Last 4 Encounters:   12/15/17 (!) 145.2 kg   11/17/17 (!) 145.2 kg   07/07/17 (!) 144.2 kg   05/15/17 (!) 142.9 kg       Constitutional:  White  male in no acute distress.  Skin: Warm and dry. No rash.    HEENT:  Normocephalic. Atraumatic. Conjunctiva pink, sclerae anicteric.  Mucous membranes moist.  Neck:  No JVD.  Carotids 2+ with bilateral carotid bruit, suspected transmitted murmur.  Lungs:   Breath sounds are clear in all lung fields.  No wheezes, rhonchi, or rales.  Heart: Regular rate and rhythm. Normal S1  and second sound with equal components. 3/6 systolic murmur heard best at the base. No S3 or S4.  No rub or click.  Abdomen:  Soft, rounded, non-tender. No masses. Unable to palpate aorta. No hepatomegaly. No splenomegaly.  Extremities: Trace to 1+ pretibial edema bilaterally. 2+ radial, dorsalis pedis, and posterior tibial pulses equal bilaterally.   Neurologic:  Alert & oriented x 3. Normal affect and mood.  No gross motor or sensory deficits.      OTHER PERTINENT LABS      Lab Results   Component Value Date    POTASSIUM 4.4 11/10/2017    SODIUM 136 11/10/2017    BUN 19 11/10/2017    CREATININE 0.78 12/14/2017    HGBA1C 6.9 (H) 11/10/2017    GLUCOSE 166 (H) 11/10/2017         IMAGING       ECHOCARDIOGRAM from 12/8/17 was reviewed:  Normal left ventricular size.  Septal hypertrophy without obstruction.  Normal left ventricular systolic function.  Left ventricular ejection fraction, 75 %.  No regional wall motion abnormalities.  Grade I/IV diastolic dysfunction (abnormal relaxation filling pattern), normal to mildly elevated filling pressures.  Upper normal right ventricular size.  Normal right ventricular systolic function.  Fractional area change slightly reduced but as an isolated finding probably not significant.  Moderately increased left atrial size.  Moderately increased right atrial size.  Mildly thickened mitral valve.  Mild mitral annular calcification.  Trace-to-mild mitral valve regurgitation.  Moderate aortic valve calcification.  Moderate aortic valve regurgitation.  Moderate aortic valve stenosis, mean gradient 35.3 mmHg, JEAN 1.2 cm².  Mild tricuspid valve regurgitation.  The entire thoracic aorta appears mildly-moderately enlarged.  Definity contrast was utilized to better visualize the endocardial definition.  Compared to prior study, the AS is slightly more severe but remains moderate. The enlarged aorta is more prominent throughout.      ECG INTERPRETATION   Sinus rhythm with first-degree AV  block  TX interval measured at 218 ms  When compared to previous EKG from 12/19/16, now has a first-degree AV block otherwise no other significant changes      Dr. Bragg was present in the office suite at the time of today's visit and was available for consult.       Dominic Turpin NP Cardiology.  12/15/2017  10:38 AM

## 2021-02-17 ENCOUNTER — TELEPHONE (OUTPATIENT)
Dept: CARDIOLOGY CLINIC | Age: 66
End: 2021-02-17

## 2021-02-17 NOTE — TELEPHONE ENCOUNTER
Fax received from [facility] for clearance. Procedure: extraction of 5 teeth in left max & casey arches. Outpatient with local and socket hemostatic products and sutures.   Dr: unable to read Camille Pacheco...)  Medication requested to hold: Plavix    Fax to: [phone]

## 2021-10-29 ENCOUNTER — OFFICE VISIT (OUTPATIENT)
Dept: CARDIOLOGY CLINIC | Age: 66
End: 2021-10-29
Payer: MEDICARE

## 2021-10-29 VITALS
HEART RATE: 58 BPM | BODY MASS INDEX: 24.59 KG/M2 | SYSTOLIC BLOOD PRESSURE: 114 MMHG | DIASTOLIC BLOOD PRESSURE: 76 MMHG | OXYGEN SATURATION: 98 % | WEIGHT: 166 LBS | HEIGHT: 69 IN

## 2021-10-29 DIAGNOSIS — I10 HTN (HYPERTENSION), BENIGN: ICD-10-CM

## 2021-10-29 DIAGNOSIS — I25.10 CORONARY ARTERY DISEASE INVOLVING NATIVE HEART WITHOUT ANGINA PECTORIS, UNSPECIFIED VESSEL OR LESION TYPE: ICD-10-CM

## 2021-10-29 DIAGNOSIS — I25.10 ATHEROSCLEROSIS OF NATIVE CORONARY ARTERY OF NATIVE HEART WITHOUT ANGINA PECTORIS: Primary | ICD-10-CM

## 2021-10-29 DIAGNOSIS — E78.5 DYSLIPIDEMIA: ICD-10-CM

## 2021-10-29 PROCEDURE — 1101F PT FALLS ASSESS-DOCD LE1/YR: CPT | Performed by: SPECIALIST

## 2021-10-29 PROCEDURE — G8420 CALC BMI NORM PARAMETERS: HCPCS | Performed by: SPECIALIST

## 2021-10-29 PROCEDURE — 3017F COLORECTAL CA SCREEN DOC REV: CPT | Performed by: SPECIALIST

## 2021-10-29 PROCEDURE — G8432 DEP SCR NOT DOC, RNG: HCPCS | Performed by: SPECIALIST

## 2021-10-29 PROCEDURE — G8427 DOCREV CUR MEDS BY ELIG CLIN: HCPCS | Performed by: SPECIALIST

## 2021-10-29 PROCEDURE — G8752 SYS BP LESS 140: HCPCS | Performed by: SPECIALIST

## 2021-10-29 PROCEDURE — G8754 DIAS BP LESS 90: HCPCS | Performed by: SPECIALIST

## 2021-10-29 PROCEDURE — 93000 ELECTROCARDIOGRAM COMPLETE: CPT | Performed by: SPECIALIST

## 2021-10-29 PROCEDURE — G8536 NO DOC ELDER MAL SCRN: HCPCS | Performed by: SPECIALIST

## 2021-10-29 PROCEDURE — 99214 OFFICE O/P EST MOD 30 MIN: CPT | Performed by: SPECIALIST

## 2021-10-29 NOTE — PROGRESS NOTES
Thelma Rodriguez is a 74 y.o. male    Visit Vitals  /76 (BP 1 Location: Left upper arm, BP Patient Position: Sitting, BP Cuff Size: Adult)   Pulse (!) 58   Ht 5' 9\" (1.753 m)   Wt 166 lb (75.3 kg)   SpO2 98%   BMI 24.51 kg/m²       Chief Complaint   Patient presents with    Hypertension    Other     ATHERO    Other     DLD       Chest pain NO  SOB NO  Dizziness NO  Swelling NO  Recent hospital visit NO  Refills VIAGRA  COVID VACCINE STATUS YES  HAD COVID?  NO

## 2021-10-29 NOTE — PROGRESS NOTES
Nelli Greer MD. [facility]              Patient: Jane Harmon  : 1955      Today's Date: 10/29/2021            HISTORY OF PRESENT ILLNESS:     History of Present Illness:  He has been doing well. No cardiac complaints. No CP or SOB. No dizziness. PAST MEDICAL HISTORY:     Past Medical History:   Diagnosis Date    Coronary atherosclerosis of native coronary artery     BESS to 90% prox LAD leison 1/3/12;   PCI in ;  [place]  with PCI to LCX    Dyslipidemia     Gout     HTN (hypertension)        Past Surgical History:   Procedure Laterality Date    CARDIAC CATHETERIZATION  1/3/12    90% prox LAD stenosis; LVEF 55%; had Xience BESS placed to prox LAD    CARDIAC CATHETERIZATION      PCI to LCX    ECHO STRESS  11    he walked 6:31, stopping for chest pain. 2 mm inferolateral ST depressions . Stress echo images were grossly normal.     HX OTHER SURGICAL      Exercise cardiolite 13 - walked 9 min (10 METS), normal stress EKG and MPI, LVEF 52%     STRESS TEST CARDIAC  3/09    treadmill only - walked 9:05; blunted HR response due to beta-blocker; no angina and normal EKG    STRESS TEST CARDIAC  12    walked 9 min, (10.2 METS) non-diagnostic EKg chanes; normal stress test; few PVC's         MEDICATIONS:     Current Outpatient Medications   Medication Sig Dispense Refill    sildenafil citrate (Viagra) 50 mg tablet Take 50 mg by mouth as needed for Erectile Dysfunction.  nitroglycerin (NITROSTAT) 0.4 mg SL tablet 1 Tab by SubLINGual route every five (5) minutes as needed for Chest Pain. Up to 3 doses. 1 Tab 1    clopidogreL (PLAVIX) 75 mg tab Take 1 Tab by mouth daily. 30 Tab 1    famotidine (PEPCID) 20 mg tablet Take 1 Tab by mouth daily. Stop ranitidine 30 Tab 1    atorvastatin (LIPITOR) 40 mg tablet Take 1 Tab by mouth nightly. 90 Tab 3    acetaminophen (TYLENOL) 325 mg tablet Take  by mouth every four (4) hours as needed for Pain.       doxycycline (ADOXA) 100 mg tablet Take 100 mg by mouth two (2) times a day. Allergies   Allergen Reactions    Allopurinol Itching     swelling           SOCIAL HISTORY:     Social History     Tobacco Use    Smoking status: Never Smoker    Smokeless tobacco: Never Used   Substance Use Topics    Alcohol use: No     Alcohol/week: 0.0 standard drinks    Drug use: No         FAMILY HISTORY:     Family History   Problem Relation Age of Onset    Stroke Sister              REVIEW OF SYSTEMS:         Review of Systems:    Constitutional: Negative for fever, chills    HEENT: Negative for vision changes.    Respiratory: Negative for cough    Cardiovascular: Negative for orthopnea, syncope, and PND.    Gastrointestinal: Negative for abdominal pain, diarrhea, or melena    Genitourinary: Negative for dysuria    Musculoskeletal: Negative for myalgias.    Skin: Negative for rash    Heme: No problems bleeding.    Neurological: Negative for speech change and focal weakness.                   PHYSICAL EXAM:     Physical Exam:  Visit Vitals  /76 (BP 1 Location: Left upper arm, BP Patient Position: Sitting, BP Cuff Size: Adult)   Pulse (!) 58   Ht 5' 9\" (1.753 m)   Wt 166 lb (75.3 kg)   SpO2 98%   BMI 24.51 kg/m²     Patient appears generally well, mood and affect are appropriate and pleasant. HEENT:  Hearing intact, non-icteric, normocephalic, atraumatic. Neck Exam: Supple, No JVD or carotid bruits. Lung Exam: Clear to auscultation, even breath sounds. Cardiac Exam: Regular rate and rhythm with no murmur or rub  Abdomen: Soft, non-tender, normal bowel sounds. No bruits or masses. Extremities: Moves all ext well. No lower extremity edema. MSKTL: Overall good ROM ext  Skin: No significant rashes  Vascular: 2+ dorsalis pedis pulses bilaterally.   Psych: Appropriate affect  Neuro - Grossly intact                    LABS / OTHER STUDIES:         Lab Results   Component Value Date/Time    Sodium 141 02/14/2020 09:05 AM Potassium 4.3 02/14/2020 09:05 AM    Chloride 112 (H) 02/14/2020 09:05 AM    CO2 26 02/14/2020 09:05 AM    Anion gap 3 (L) 02/14/2020 09:05 AM    Glucose 95 02/14/2020 09:05 AM    BUN 14 01/08/2021 03:30 PM    Creatinine 1.18 01/08/2021 03:30 PM    BUN/Creatinine ratio 14 02/14/2020 09:05 AM    GFR est AA >60 01/08/2021 03:30 PM    GFR est non-AA >60 01/08/2021 03:30 PM    Calcium 9.1 02/14/2020 09:05 AM    Bilirubin, total 0.6 02/14/2020 09:05 AM    Alk.  phosphatase 216 (H) 02/14/2020 09:05 AM    Protein, total 7.2 02/14/2020 09:05 AM    Albumin 3.7 02/14/2020 09:05 AM    Globulin 3.5 02/14/2020 09:05 AM    A-G Ratio 1.1 02/14/2020 09:05 AM    ALT (SGPT) 44 02/14/2020 09:05 AM    AST (SGOT) 31 02/14/2020 09:05 AM     Lab Results   Component Value Date/Time    WBC 5.0 01/05/2012 04:20 AM    HGB 15.8 01/05/2012 04:20 AM    HCT 46.6 01/05/2012 04:20 AM    PLATELET 234 63/04/5975 04:20 AM    MCV 84.4 01/05/2012 04:20 AM     Lab Results   Component Value Date/Time    Cholesterol, total 142 02/14/2020 09:05 AM    HDL Cholesterol 38 02/14/2020 09:05 AM    LDL, calculated 85.8 02/14/2020 09:05 AM    VLDL, calculated 18.2 02/14/2020 09:05 AM    Triglyceride 91 02/14/2020 09:05 AM    CHOL/HDL Ratio 3.7 02/14/2020 09:05 AM        Lab Results   Component Value Date/Time    Hemoglobin A1c 6.1 (H) 02/14/2020 09:05 AM    Hemoglobin A1c, External 6.0 04/24/2018 12:00 AM       Labs 9/21 - CBC OK, BMP OK, chol 188, , HDL 37,         CARDIAC DIAGNOSTICS:             EKG 2/11/13 - marked sinus bradycardia, HR 47, NSST changes    EKG 8/11/13 - sinus bradycardia, HR 54, ST-T changes in the inferolateral leads (ST-T changes are more pronounced c/w 2/11/13)    EKG 11/26/14 - sinus bradycardia, HR 42, inferolateral ST-T changes    EKG 3/16/16 - sinus linnea, HR 49, inferolateral ST-T changes   EKG 9/15/17 - sinus linnea, HR 51, NSST changes   EKG 10/5/18 - sinus linnea, non-specific T wave inversion   EKG 10/18/19 - sinus linnea, inferolateral TWI   EKG 10/23/20 - sinus linnea, non-specific T wave abn   EKG 10/29/21 - sinus linnea, non-specific T wave abn              ASSESSMENT AND PLAN:         Assessment and Plan:   1) CAD    - PCI in 2006; MI 11/08 with PCI to LCX; BESS to 90% prox LAD leison 1/3/12  - Exercise cardiolite 8/20/13 - walked 9 min (10 METS), normal stress EKG and MPI, LVEF 52%    - Mr. Isai Maldonado denies any anginal complaints    - He is on plavix, statin--- (he takes a single antiplatelet)   - No BB due to bradycardia   - discussed importance of healthy diet and,exercise   - he walks a lot   - check an echo       2) HTN    - Due to concerns of gout, stopped chlorthalidone in past   - BP looks OK off of lisinopril - continue to follow BP       3) Dyslipidemia  - Goal LDL < 70 given that he has had coronary stenting 3 times  - cont statin   - he was off of meds for a while when dealing with cancer --> he is now back on statin     4) Throat Cancer -      - Lost 60+ lbs  - treated at [facility]     5)  See Dr. Maryann Patterson in one year in [place]. Patient expressed understanding of the plan - questions were answered.       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  Has son (lives with son) and daughter.   Chin Gay MD, [address], [address]      [id number]  [address]  Ph: [phone]                                [phone]       ADDENDUM   11/15/2021    Echo 11/15/21 - LVEF 50-55%. Normal LV strain. Mild MR. Mod LAE. Mild AI. Echo shows stable findings. Normal LV function.   Will have nurse call.

## 2021-10-29 NOTE — PROGRESS NOTES
Orders for Check an echo soon   See Dr. Xochitl Anthony in one year in [place] per Dr. Gonzalo Root.   Dx: cad

## 2021-11-15 ENCOUNTER — ANCILLARY PROCEDURE (OUTPATIENT)
Dept: CARDIOLOGY CLINIC | Age: 66
End: 2021-11-15
Payer: MEDICARE

## 2021-11-15 VITALS
WEIGHT: 166 LBS | SYSTOLIC BLOOD PRESSURE: 130 MMHG | DIASTOLIC BLOOD PRESSURE: 70 MMHG | HEIGHT: 69 IN | BODY MASS INDEX: 24.59 KG/M2

## 2021-11-15 DIAGNOSIS — I25.10 CORONARY ARTERY DISEASE INVOLVING NATIVE HEART WITHOUT ANGINA PECTORIS, UNSPECIFIED VESSEL OR LESION TYPE: ICD-10-CM

## 2021-11-15 DIAGNOSIS — I10 HTN (HYPERTENSION), BENIGN: ICD-10-CM

## 2021-11-15 DIAGNOSIS — I25.10 ATHEROSCLEROSIS OF NATIVE CORONARY ARTERY OF NATIVE HEART WITHOUT ANGINA PECTORIS: ICD-10-CM

## 2021-11-15 LAB
AV R PG: 58.5 MMHG
ECHO AO ASC DIAM: 3.25 CM
ECHO AO ROOT DIAM: 3.64 CM
ECHO AR MAX VEL PISA: 382.42 CM/S
ECHO AV AREA PEAK VELOCITY: 2.63 CM2
ECHO AV AREA VTI: 2.79 CM2
ECHO AV AREA/BSA PEAK VELOCITY: 1.4 CM2/M2
ECHO AV AREA/BSA VTI: 1.5 CM2/M2
ECHO AV MEAN GRADIENT: 3 MMHG
ECHO AV PEAK GRADIENT: 6.15 MMHG
ECHO AV PEAK VELOCITY: 124.02 CM/S
ECHO AV REGURGITANT PHT: 712.59 MS
ECHO AV VTI: 26.74 CM
ECHO EST RA PRESSURE: 3 MMHG
ECHO LA AREA 4C: 25.9 CM2
ECHO LA MAJOR AXIS: 3.52 CM
ECHO LA MINOR AXIS: 1.84 CM
ECHO LA VOL 2C: 77 ML (ref 18–58)
ECHO LA VOL 4C: 81 ML (ref 18–58)
ECHO LA VOL BP: 88.84 ML (ref 18–58)
ECHO LA VOL/BSA BIPLANE: 46.51 ML/M2 (ref 16–28)
ECHO LA VOLUME INDEX A2C: 40.31 ML/M2 (ref 16–28)
ECHO LA VOLUME INDEX A4C: 42.41 ML/M2 (ref 16–28)
ECHO LV E' LATERAL VELOCITY: 9.48 CM/S
ECHO LV E' SEPTAL VELOCITY: 5.3 CM/S
ECHO LV EDV A2C: 137.06 ML
ECHO LV EDV A4C: 123.13 ML
ECHO LV EDV BP: 130.05 ML (ref 67–155)
ECHO LV EDV INDEX A4C: 64.5 ML/M2
ECHO LV EDV INDEX BP: 68.1 ML/M2
ECHO LV EDV NDEX A2C: 71.8 ML/M2
ECHO LV EJECTION FRACTION A2C: 54 PERCENT
ECHO LV EJECTION FRACTION A4C: 54 PERCENT
ECHO LV EJECTION FRACTION BIPLANE: 53.4 PERCENT (ref 55–100)
ECHO LV ESV A2C: 63.49 ML
ECHO LV ESV A4C: 56.84 ML
ECHO LV ESV BP: 60.6 ML (ref 22–58)
ECHO LV ESV INDEX A2C: 33.2 ML/M2
ECHO LV ESV INDEX A4C: 29.8 ML/M2
ECHO LV ESV INDEX BP: 31.7 ML/M2
ECHO LV GLOBAL LONGITUDINAL STRAIN (GLS): -18 PERCENT
ECHO LV INTERNAL DIMENSION DIASTOLIC: 4.55 CM (ref 4.2–5.9)
ECHO LV INTERNAL DIMENSION SYSTOLIC: 2.64 CM
ECHO LV IVSD: 0.9 CM (ref 0.6–1)
ECHO LV MASS 2D: 133.3 G (ref 88–224)
ECHO LV MASS INDEX 2D: 69.8 G/M2 (ref 49–115)
ECHO LV POSTERIOR WALL DIASTOLIC: 0.88 CM (ref 0.6–1)
ECHO LVOT DIAM: 2.13 CM
ECHO LVOT PEAK GRADIENT: 3.35 MMHG
ECHO LVOT PEAK VELOCITY: 91.54 CM/S
ECHO LVOT SV: 74.5 ML
ECHO LVOT VTI: 20.88 CM
ECHO MV A VELOCITY: 39.23 CM/S
ECHO MV E DECELERATION TIME (DT): 183.5 MS
ECHO MV E VELOCITY: 62.51 CM/S
ECHO MV E/A RATIO: 1.59
ECHO MV E/E' LATERAL: 6.59
ECHO MV E/E' RATIO (AVERAGED): 9.19
ECHO MV E/E' SEPTAL: 11.79
ECHO MV PRESSURE HALF TIME (PHT): 53.21 MS
ECHO RA AREA 4C: 13.45 CM2
ECHO RIGHT VENTRICULAR SYSTOLIC PRESSURE (RVSP): 24.41 MMHG
ECHO RV INTERNAL DIMENSION: 3.49 CM
ECHO RV TAPSE: 1.98 CM (ref 1.5–2)
ECHO TV REGURGITANT MAX VELOCITY: 231.34 CM/S
ECHO TV REGURGITANT PEAK GRADIENT: 21.41 MMHG
GLOBAL LONGITUDINAL STRAIN 2 CHAMBER: -18.8 PERCENT
GLOBAL LONGITUDINAL STRAIN 4 CHAMBER: -18.1 PERCENT
GLOBAL LONGITUDINAL STRAIN LONG AXIS: -17.2 PERCENT
LA VOL DISK BP: 81.96 ML (ref 18–58)

## 2021-11-15 PROCEDURE — 93306 TTE W/DOPPLER COMPLETE: CPT | Performed by: SPECIALIST

## 2021-11-16 ENCOUNTER — TELEPHONE (OUTPATIENT)
Dept: CARDIOLOGY CLINIC | Age: 66
End: 2021-11-16

## 2021-11-16 NOTE — TELEPHONE ENCOUNTER
----- Message from Kenn Hoffman MD sent at 11/15/2021  8:35 PM EST -----  Can you please give him echo results. LV function is normal.  Findings stable.    Thanks

## 2021-11-17 ENCOUNTER — TELEPHONE (OUTPATIENT)
Dept: CARDIOLOGY CLINIC | Age: 66
End: 2021-11-17

## 2021-11-17 NOTE — TELEPHONE ENCOUNTER
Called pt, confirmed ID [id number],  Per Dr. Priti Borja: Magan Herman you please give him echo results 11/15/2021.  LV function is normal.  Findings stable. \"    NOV needed w Dr. Benedicto Raza in [place] x1 year

## 2021-11-18 NOTE — TELEPHONE ENCOUNTER
Verified patient with two types of identifiers. Spoke to patient and scheduled annual OV at [facility]. Patient verbalized understanding and will call with any other questions.         Future Appointments   Date Time Provider [address]   10/12/2022 10:00 AM MD ELMER Herrrea AMB

## 2022-10-12 ENCOUNTER — TELEPHONE (OUTPATIENT)
Dept: FAMILY MEDICINE CLINIC | Age: 67
End: 2022-10-12

## 2022-10-12 ENCOUNTER — TELEPHONE (OUTPATIENT)
Dept: CARDIOLOGY CLINIC | Facility: CLINIC | Age: 67
End: 2022-10-12

## 2022-10-12 NOTE — TELEPHONE ENCOUNTER
Patient called, notified of need to reschedule appt for 10/19/22 at [facility] location. Stated he will call [facility][note 3] to attempt to schedule sooner appt.